# Patient Record
Sex: MALE | Race: WHITE | Employment: FULL TIME | URBAN - METROPOLITAN AREA
[De-identification: names, ages, dates, MRNs, and addresses within clinical notes are randomized per-mention and may not be internally consistent; named-entity substitution may affect disease eponyms.]

---

## 2023-04-01 ENCOUNTER — APPOINTMENT (OUTPATIENT)
Dept: CT IMAGING | Age: 43
End: 2023-04-01
Attending: EMERGENCY MEDICINE
Payer: COMMERCIAL

## 2023-04-01 ENCOUNTER — HOSPITAL ENCOUNTER (EMERGENCY)
Age: 43
Discharge: HOME | End: 2023-04-01
Attending: EMERGENCY MEDICINE
Payer: COMMERCIAL

## 2023-04-01 VITALS
WEIGHT: 230 LBS | HEIGHT: 72 IN | TEMPERATURE: 98.3 F | HEART RATE: 70 BPM | OXYGEN SATURATION: 94 % | SYSTOLIC BLOOD PRESSURE: 167 MMHG | BODY MASS INDEX: 31.15 KG/M2 | RESPIRATION RATE: 11 BRPM | DIASTOLIC BLOOD PRESSURE: 111 MMHG

## 2023-04-01 DIAGNOSIS — R10.12 ABDOMINAL PAIN, LUQ (LEFT UPPER QUADRANT): Primary | ICD-10-CM

## 2023-04-01 DIAGNOSIS — R55 SYNCOPE AND COLLAPSE: ICD-10-CM

## 2023-04-01 LAB
ALBUMIN SERPL-MCNC: 3.9 G/DL (ref 3.5–5)
ALBUMIN/GLOB SERPL: 1.2 (ref 1.1–2.2)
ALP SERPL-CCNC: 74 U/L (ref 45–117)
ALT SERPL-CCNC: 26 U/L (ref 12–78)
AMPHET UR QL SCN: NEGATIVE
ANION GAP SERPL CALC-SCNC: 3 MMOL/L (ref 5–15)
APPEARANCE UR: CLEAR
AST SERPL-CCNC: 12 U/L (ref 15–37)
BACTERIA URNS QL MICRO: NEGATIVE /HPF
BARBITURATES UR QL SCN: NEGATIVE
BASOPHILS # BLD: 0.1 K/UL (ref 0–0.1)
BASOPHILS NFR BLD: 1 % (ref 0–1)
BENZODIAZ UR QL: NEGATIVE
BILIRUB SERPL-MCNC: 0.6 MG/DL (ref 0.2–1)
BILIRUB UR QL: NEGATIVE
BUN SERPL-MCNC: 13 MG/DL (ref 6–20)
BUN/CREAT SERPL: 13 (ref 12–20)
CALCIUM SERPL-MCNC: 9 MG/DL (ref 8.5–10.1)
CANNABINOIDS UR QL SCN: NEGATIVE
CHLORIDE SERPL-SCNC: 106 MMOL/L (ref 97–108)
CK SERPL-CCNC: 73 U/L (ref 39–308)
CO2 SERPL-SCNC: 28 MMOL/L (ref 21–32)
COCAINE UR QL SCN: NEGATIVE
COLOR UR: NORMAL
COMMENT, HOLDF: NORMAL
COMMENT, HOLDF: NORMAL
CREAT SERPL-MCNC: 1 MG/DL (ref 0.7–1.3)
DIFFERENTIAL METHOD BLD: NORMAL
DRUG SCRN COMMENT,DRGCM: NORMAL
EOSINOPHIL # BLD: 0.3 K/UL (ref 0–0.4)
EOSINOPHIL NFR BLD: 3 % (ref 0–7)
EPITH CASTS URNS QL MICRO: NORMAL /LPF
ERYTHROCYTE [DISTWIDTH] IN BLOOD BY AUTOMATED COUNT: 11.7 % (ref 11.5–14.5)
GLOBULIN SER CALC-MCNC: 3.2 G/DL (ref 2–4)
GLUCOSE SERPL-MCNC: 100 MG/DL (ref 65–100)
GLUCOSE UR STRIP.AUTO-MCNC: NEGATIVE MG/DL
HCT VFR BLD AUTO: 40.9 % (ref 36.6–50.3)
HGB BLD-MCNC: 14.6 G/DL (ref 12.1–17)
HGB UR QL STRIP: NEGATIVE
HYALINE CASTS URNS QL MICRO: NORMAL /LPF (ref 0–2)
IMM GRANULOCYTES # BLD AUTO: 0 K/UL (ref 0–0.04)
IMM GRANULOCYTES NFR BLD AUTO: 0 % (ref 0–0.5)
KETONES UR QL STRIP.AUTO: NEGATIVE MG/DL
LEUKOCYTE ESTERASE UR QL STRIP.AUTO: NEGATIVE
LIPASE SERPL-CCNC: 132 U/L (ref 73–393)
LYMPHOCYTES # BLD: 2.4 K/UL (ref 0.8–3.5)
LYMPHOCYTES NFR BLD: 26 % (ref 12–49)
MCH RBC QN AUTO: 29.4 PG (ref 26–34)
MCHC RBC AUTO-ENTMCNC: 35.7 G/DL (ref 30–36.5)
MCV RBC AUTO: 82.3 FL (ref 80–99)
METHADONE UR QL: NEGATIVE
MONOCYTES # BLD: 0.6 K/UL (ref 0–1)
MONOCYTES NFR BLD: 7 % (ref 5–13)
NEUTS SEG # BLD: 5.8 K/UL (ref 1.8–8)
NEUTS SEG NFR BLD: 63 % (ref 32–75)
NITRITE UR QL STRIP.AUTO: NEGATIVE
NRBC # BLD: 0 K/UL (ref 0–0.01)
NRBC BLD-RTO: 0 PER 100 WBC
OPIATES UR QL: NEGATIVE
PCP UR QL: NEGATIVE
PH UR STRIP: 5.5 (ref 5–8)
PLATELET # BLD AUTO: 284 K/UL (ref 150–400)
PMV BLD AUTO: 10.4 FL (ref 8.9–12.9)
POTASSIUM SERPL-SCNC: 3.8 MMOL/L (ref 3.5–5.1)
PROT SERPL-MCNC: 7.1 G/DL (ref 6.4–8.2)
PROT UR STRIP-MCNC: NEGATIVE MG/DL
RBC # BLD AUTO: 4.97 M/UL (ref 4.1–5.7)
RBC #/AREA URNS HPF: NORMAL /HPF (ref 0–5)
SAMPLES BEING HELD,HOLD: NORMAL
SAMPLES BEING HELD,HOLD: NORMAL
SODIUM SERPL-SCNC: 137 MMOL/L (ref 136–145)
SP GR UR REFRACTOMETRY: 1.02 (ref 1–1.03)
UA: UC IF INDICATED,UAUC: NORMAL
UROBILINOGEN UR QL STRIP.AUTO: 0.2 EU/DL (ref 0.2–1)
WBC # BLD AUTO: 9.1 K/UL (ref 4.1–11.1)
WBC URNS QL MICRO: NORMAL /HPF (ref 0–4)

## 2023-04-01 PROCEDURE — 82550 ASSAY OF CK (CPK): CPT

## 2023-04-01 PROCEDURE — 85025 COMPLETE CBC W/AUTO DIFF WBC: CPT

## 2023-04-01 PROCEDURE — 80307 DRUG TEST PRSMV CHEM ANLYZR: CPT

## 2023-04-01 PROCEDURE — 99285 EMERGENCY DEPT VISIT HI MDM: CPT

## 2023-04-01 PROCEDURE — 36415 COLL VENOUS BLD VENIPUNCTURE: CPT

## 2023-04-01 PROCEDURE — 80053 COMPREHEN METABOLIC PANEL: CPT

## 2023-04-01 PROCEDURE — 81001 URINALYSIS AUTO W/SCOPE: CPT

## 2023-04-01 PROCEDURE — 93005 ELECTROCARDIOGRAM TRACING: CPT

## 2023-04-01 PROCEDURE — 74178 CT ABD&PLV WO CNTR FLWD CNTR: CPT

## 2023-04-01 PROCEDURE — 74011250636 HC RX REV CODE- 250/636: Performed by: EMERGENCY MEDICINE

## 2023-04-01 PROCEDURE — 83690 ASSAY OF LIPASE: CPT

## 2023-04-01 PROCEDURE — 74011000636 HC RX REV CODE- 636: Performed by: RADIOLOGY

## 2023-04-01 PROCEDURE — 70450 CT HEAD/BRAIN W/O DYE: CPT

## 2023-04-01 RX ORDER — DICYCLOMINE HYDROCHLORIDE 20 MG/1
20 TABLET ORAL
Qty: 20 TABLET | Refills: 0 | Status: SHIPPED | OUTPATIENT
Start: 2023-04-01

## 2023-04-01 RX ADMIN — IOPAMIDOL 100 ML: 755 INJECTION, SOLUTION INTRAVENOUS at 14:02

## 2023-04-01 RX ADMIN — SODIUM CHLORIDE 1000 ML: 9 INJECTION, SOLUTION INTRAVENOUS at 13:24

## 2023-04-01 NOTE — ED TRIAGE NOTES
Pt arrived to ED in wheelchair c/o bright red blood in stool x 1 month with syncopal episode x 3 weeks ago. States he came to ED today because he woke up this morning with LUQ abdominal pain. Denies N/V/D. Takes Kratom and Suboxone. Occasional Etoh use. Denies any medical history.

## 2023-04-01 NOTE — ED PROVIDER NOTES
79-year-old male denies any pertinent medical history presents to the emergency department chief complaint of abdominal pain in his left lower quadrant since the past several days but worsening today. Has had episodes of syncope including couple weeks ago and today. He thinks this may be due to Kratom use. He is on buprenorphine. He was seen at an outside emergency department about 3 weeks ago for syncopal episode with unremarkable work-up. Also has had some rectal bleeding for the past several days. No abdominal surgeries.  aware shows buprenorphine prescription from [place]     Has not been taking lisinopril which was prescribed about a week ago      Melena        No past medical history on file. No past surgical history on file. No family history on file. Social History     Socioeconomic History    Marital status:      Spouse name: Not on file    Number of children: Not on file    Years of education: Not on file    Highest education level: Not on file   Occupational History    Not on file   Tobacco Use    Smoking status: Former    Smokeless tobacco: Current   Vaping Use    Vaping Use: Not on file   Substance and Sexual Activity    Alcohol use: Not Currently    Drug use: Not Currently    Sexual activity: Not on file   Other Topics Concern    Not on file   Social History Narrative    Not on file     Social Determinants of Health     Financial Resource Strain: Not on file   Food Insecurity: Not on file   Transportation Needs: Not on file   Physical Activity: Not on file   Stress: Not on file   Social Connections: Not on file   Intimate Partner Violence: Not on file   Housing Stability: Not on file         ALLERGIES: Patient has no known allergies. Review of Systems   Gastrointestinal:  Positive for melena.      Vitals:    04/01/23 1230   BP: (!) 150/100   Pulse: 73   Resp: 16   Temp: 98.3 °F (36.8 °C)   SpO2: 99%   Weight: 104.3 kg (230 lb)   Height: 6' (1.829 m)            Physical Exam  Vitals and nursing note reviewed. Constitutional:       Appearance: Normal appearance. Cardiovascular:      Rate and Rhythm: Normal rate. Pulses: Normal pulses. Pulmonary:      Effort: Pulmonary effort is normal. No respiratory distress. Abdominal:      Palpations: Abdomen is soft. Tenderness: There is no abdominal tenderness. Neurological:      Mental Status: He is alert. Medical Decision Making  44-year-old male presents as above with presyncopal symptoms, recent syncope, abdominal pain. Work-up in the emergency department is reassuring. He is low risk by [place] syncope rules. Labs are reassuring. CT scan without acute pathology. Potentially is having exaggerated vasovagal response to abdominal pain. Will trial dicyclomine and refer to cardiology for syncope. Amount and/or Complexity of Data Reviewed  Labs: ordered. Radiology: ordered and independent interpretation performed. Decision-making details documented in ED Course. ECG/medicine tests: ordered and independent interpretation performed. Decision-making details documented in ED Course. Risk  Prescription drug management. ED Course as of 04/01/23 1511   Sat Apr 01, 2023   1259 Patient reports to nurse that he now feels that he has blurry vision and body tingling. Neurologic examination complains of blurry vision despite which eye I cover. Finger-nose intact, strength intact and equal bilaterally, sensation intact and equal bilaterally. [JM]   1310   ED EKG interpretation:  Rhythm: normal sinus rhythm. Rate (approx.): 68.  Axis: normal.  ST segment:  No concerning ST elevations or depressions. This EKG was interpreted by Shari Khalil MD,ED Provider. [JM]   6092 I have independently viewed the obtained radiographic images and note CT head without acute findings. Will await radiology read.  [JM]      ED Course User Index  [JM] Michael Hernandez MD       Procedures

## 2023-04-01 NOTE — DISCHARGE INSTRUCTIONS
Thank you for allowing us to provide you with medical care today. We realize that you have many choices for your emergency care needs. We thank you for choosing [facility]. Please choose us in the future for any continued health care needs. We hope we addressed all of your medical concerns. We strive to provide excellent quality care in the Emergency Department. Anything less than excellent does not meet our expectations. The exam and treatment you received in the Emergency Department were for an emergent problem and are not intended as complete care. It is important that you follow up with a doctor, nurse practitioner, or physician's assistant for ongoing care. If your symptoms worsen or you do not improve as expected and you are unable to reach your usual health care provider, you should return to the Emergency Department. We are available 24 hours a day. Take this sheet with you when you go to your follow-up visit. If you have any problem arranging the follow-up visit, contact the Emergency Department immediately. Make an appointment your family doctor for follow up of this visit. Return to the ER if you are unable to be seen in a timely manner.

## 2023-04-02 LAB
ATRIAL RATE: 68 BPM
CALCULATED P AXIS, ECG09: 58 DEGREES
CALCULATED R AXIS, ECG10: 12 DEGREES
CALCULATED T AXIS, ECG11: 31 DEGREES
DIAGNOSIS, 93000: NORMAL
P-R INTERVAL, ECG05: 136 MS
Q-T INTERVAL, ECG07: 408 MS
QRS DURATION, ECG06: 86 MS
QTC CALCULATION (BEZET), ECG08: 433 MS
VENTRICULAR RATE, ECG03: 68 BPM

## 2023-04-04 ENCOUNTER — HOSPITAL ENCOUNTER (OUTPATIENT)
Age: 43
End: 2023-04-04
Attending: EMERGENCY MEDICINE
Payer: COMMERCIAL

## 2023-04-04 VITALS
OXYGEN SATURATION: 96 % | RESPIRATION RATE: 16 BRPM | TEMPERATURE: 97.9 F | WEIGHT: 230 LBS | HEART RATE: 59 BPM | HEIGHT: 72 IN | BODY MASS INDEX: 31.15 KG/M2 | SYSTOLIC BLOOD PRESSURE: 101 MMHG | DIASTOLIC BLOOD PRESSURE: 62 MMHG

## 2023-04-04 LAB
ALBUMIN SERPL-MCNC: 3.6 G/DL (ref 3.5–5)
ALBUMIN/GLOB SERPL: 1.2 (ref 1.1–2.2)
ALP SERPL-CCNC: 77 U/L (ref 45–117)
ALT SERPL-CCNC: 24 U/L (ref 12–78)
ANION GAP SERPL CALC-SCNC: 3 MMOL/L (ref 5–15)
AST SERPL-CCNC: 13 U/L (ref 15–37)
BASOPHILS # BLD: 0.1 K/UL (ref 0–0.1)
BASOPHILS NFR BLD: 1 % (ref 0–1)
BILIRUB SERPL-MCNC: 0.4 MG/DL (ref 0.2–1)
BUN SERPL-MCNC: 12 MG/DL (ref 6–20)
BUN/CREAT SERPL: 12 (ref 12–20)
CALCIUM SERPL-MCNC: 8.6 MG/DL (ref 8.5–10.1)
CHLORIDE SERPL-SCNC: 106 MMOL/L (ref 97–108)
CO2 SERPL-SCNC: 28 MMOL/L (ref 21–32)
COMMENT, HOLDF: NORMAL
CREAT SERPL-MCNC: 1 MG/DL (ref 0.7–1.3)
D DIMER PPP FEU-MCNC: 0.19 MG/L FEU (ref 0–0.65)
DIFFERENTIAL METHOD BLD: ABNORMAL
EOSINOPHIL # BLD: 0.3 K/UL (ref 0–0.4)
EOSINOPHIL NFR BLD: 4 % (ref 0–7)
ERYTHROCYTE [DISTWIDTH] IN BLOOD BY AUTOMATED COUNT: 12.1 % (ref 11.5–14.5)
GLOBULIN SER CALC-MCNC: 3.1 G/DL (ref 2–4)
GLUCOSE SERPL-MCNC: 125 MG/DL (ref 65–100)
HCT VFR BLD AUTO: 39.1 % (ref 36.6–50.3)
HGB BLD-MCNC: 13.7 G/DL (ref 12.1–17)
IMM GRANULOCYTES # BLD AUTO: 0 K/UL (ref 0–0.04)
IMM GRANULOCYTES NFR BLD AUTO: 1 % (ref 0–0.5)
LIPASE SERPL-CCNC: 93 U/L (ref 73–393)
LYMPHOCYTES # BLD: 2.1 K/UL (ref 0.8–3.5)
LYMPHOCYTES NFR BLD: 26 % (ref 12–49)
MAGNESIUM SERPL-MCNC: 2 MG/DL (ref 1.6–2.4)
MCH RBC QN AUTO: 29.7 PG (ref 26–34)
MCHC RBC AUTO-ENTMCNC: 35 G/DL (ref 30–36.5)
MCV RBC AUTO: 84.8 FL (ref 80–99)
MONOCYTES # BLD: 0.6 K/UL (ref 0–1)
MONOCYTES NFR BLD: 7 % (ref 5–13)
NEUTS SEG # BLD: 5.2 K/UL (ref 1.8–8)
NEUTS SEG NFR BLD: 61 % (ref 32–75)
NRBC # BLD: 0 K/UL (ref 0–0.01)
NRBC BLD-RTO: 0 PER 100 WBC
PLATELET # BLD AUTO: 250 K/UL (ref 150–400)
PMV BLD AUTO: 10.9 FL (ref 8.9–12.9)
POTASSIUM SERPL-SCNC: 3.9 MMOL/L (ref 3.5–5.1)
PROT SERPL-MCNC: 6.7 G/DL (ref 6.4–8.2)
RBC # BLD AUTO: 4.61 M/UL (ref 4.1–5.7)
SAMPLES BEING HELD,HOLD: NORMAL
SODIUM SERPL-SCNC: 137 MMOL/L (ref 136–145)
TROPONIN I SERPL HS-MCNC: 4 NG/L (ref 0–76)
TSH SERPL DL<=0.05 MIU/L-ACNC: 2.73 UIU/ML (ref 0.36–3.74)
WBC # BLD AUTO: 8.4 K/UL (ref 4.1–11.1)

## 2023-04-04 PROCEDURE — 74011250636 HC RX REV CODE- 250/636: Performed by: EMERGENCY MEDICINE

## 2023-04-04 PROCEDURE — 99284 EMERGENCY DEPT VISIT MOD MDM: CPT

## 2023-04-04 PROCEDURE — 83735 ASSAY OF MAGNESIUM: CPT

## 2023-04-04 PROCEDURE — 85025 COMPLETE CBC W/AUTO DIFF WBC: CPT

## 2023-04-04 PROCEDURE — 84484 ASSAY OF TROPONIN QUANT: CPT

## 2023-04-04 PROCEDURE — 84443 ASSAY THYROID STIM HORMONE: CPT

## 2023-04-04 PROCEDURE — 36415 COLL VENOUS BLD VENIPUNCTURE: CPT

## 2023-04-04 PROCEDURE — 93005 ELECTROCARDIOGRAM TRACING: CPT

## 2023-04-04 PROCEDURE — 83690 ASSAY OF LIPASE: CPT

## 2023-04-04 PROCEDURE — 85379 FIBRIN DEGRADATION QUANT: CPT

## 2023-04-04 PROCEDURE — 80053 COMPREHEN METABOLIC PANEL: CPT

## 2023-04-04 RX ADMIN — SODIUM CHLORIDE 1000 ML: 9 INJECTION, SOLUTION INTRAVENOUS at 10:50

## 2023-04-04 NOTE — ED NOTES
Patient does not appear to be in any acute distress/shows no evidence of clinical instability at this time. Provider has reviewed discharge instructions with the patient. The patient verbalized understanding instructions as well as need for follow up for any further symptoms. Discharge papers given, education provided, and any questions answered. Patient discharged by provider.

## 2023-04-04 NOTE — ED TRIAGE NOTES
Patient arrives to the ED via POV with complaints of a syncopal episode that happened this morning. Patient reports lightheadedness and right hand numbness prior to syncopal episode. Patient was seen here Saturday for same symptoms. Patient also reports melena.

## 2023-04-04 NOTE — ED PROVIDER NOTES
Iván Carias is a 37 yo M with who developed shortness of breath and tingling in his hand and feet before passing out in his brother's car on the way to the ED. He states he has been feeling lightheaded on and off for several weeks and is concerned that he may be having a heart attack. He takes Suboxone which he gets online and also takes Ktatom but does not believe that his symptoms could be related to that. He notes that he had a heart attack at age 31 when he was living in [place], [place]. He was seen in the ED 3 days ago with concern for GI bleed but his HGB was normal and CT showed no evidence of bleeding. A CK was checked at that time but no troponin or magnesium. Patient states he has tried to make an appointment to see a PCP but they are 2 months out so he did not make the appointment. No past medical history on file. No past surgical history on file. No family history on file. Social History     Socioeconomic History    Marital status:      Spouse name: Not on file    Number of children: Not on file    Years of education: Not on file    Highest education level: Not on file   Occupational History    Not on file   Tobacco Use    Smoking status: Former    Smokeless tobacco: Current   Vaping Use    Vaping Use: Not on file   Substance and Sexual Activity    Alcohol use: Not Currently    Drug use: Not Currently    Sexual activity: Not on file   Other Topics Concern    Not on file   Social History Narrative    Not on file     Social Determinants of Health     Financial Resource Strain: Not on file   Food Insecurity: Not on file   Transportation Needs: Not on file   Physical Activity: Not on file   Stress: Not on file   Social Connections: Not on file   Intimate Partner Violence: Not on file   Housing Stability: Not on file         ALLERGIES: Patient has no known allergies. Review of Systems   Constitutional:  Negative for fever. HENT:  Negative for sore throat.     Eyes: Negative for visual disturbance. Respiratory:  Positive for chest tightness. Negative for cough. Cardiovascular:  Negative for chest pain. Gastrointestinal:  Negative for abdominal pain. Genitourinary:  Negative for dysuria. Musculoskeletal:  Negative for back pain. Skin:  Negative for rash. Neurological:  Positive for syncope. Negative for headaches. Numbness: paresthesias. Vitals:    04/04/23 1044   BP: 121/74   Pulse: (!) 59   Resp: 18   Temp: 97.2 °F (36.2 °C)   SpO2: 99%   Weight: 104.3 kg (230 lb)   Height: 6' (1.829 m)            Physical Exam  Vitals and nursing note reviewed. Constitutional:       General: He is not in acute distress. Appearance: He is well-developed. HENT:      Head: Normocephalic and atraumatic. Mouth/Throat:      Mouth: Mucous membranes are moist.   Eyes:      Extraocular Movements: Extraocular movements intact. Conjunctiva/sclera: Conjunctivae normal.   Neck:      Trachea: Phonation normal.   Cardiovascular:      Rate and Rhythm: Normal rate. Pulmonary:      Effort: Pulmonary effort is normal. No respiratory distress. Breath sounds: No wheezing or rales. Abdominal:      General: There is no distension. Musculoskeletal:         General: No tenderness. Normal range of motion. Cervical back: Normal range of motion. Skin:     General: Skin is warm and dry. Neurological:      General: No focal deficit present. Mental Status: He is alert. He is not disoriented. Cranial Nerves: Cranial nerves 2-12 are intact. Sensory: Sensation is intact. Motor: Motor function is intact. No abnormal muscle tone. Psychiatric:         Mood and Affect: Mood is anxious. ED EKG interpretation:  Rhythm: sinus bradycardia; and regular . Rate (approx.): 52; Axis: normal; P wave: normal; QRS interval: normal ; ST/T wave: normal; Other findings: otherwise normal. This EKG was interpreted by Bunny Trujillo MD,ED Provider.      Medical Decision Making  Amount and/or Complexity of Data Reviewed  Labs: ordered. ECG/medicine tests: ordered. 1:46 PM  Patient remains in no distress. Labs including, CBC, CMP, trop, magnesium, ddimer and TSH normal.  Patient updated on results and discharged home.      Procedures

## 2023-04-05 ENCOUNTER — APPOINTMENT (OUTPATIENT)
Dept: GENERAL RADIOLOGY | Age: 43
End: 2023-04-05
Attending: STUDENT IN AN ORGANIZED HEALTH CARE EDUCATION/TRAINING PROGRAM
Payer: COMMERCIAL

## 2023-04-05 ENCOUNTER — HOSPITAL ENCOUNTER (OUTPATIENT)
Age: 43
End: 2023-04-05
Attending: STUDENT IN AN ORGANIZED HEALTH CARE EDUCATION/TRAINING PROGRAM
Payer: COMMERCIAL

## 2023-04-05 ENCOUNTER — APPOINTMENT (OUTPATIENT)
Dept: NON INVASIVE DIAGNOSTICS | Age: 43
End: 2023-04-05
Attending: INTERNAL MEDICINE
Payer: COMMERCIAL

## 2023-04-05 PROBLEM — R07.9 CHEST PAIN: Status: ACTIVE | Noted: 2023-04-05

## 2023-04-05 PROBLEM — R55 SYNCOPE AND COLLAPSE: Status: ACTIVE | Noted: 2023-04-05

## 2023-04-05 PROBLEM — R55 SYNCOPE: Status: ACTIVE | Noted: 2023-04-05

## 2023-04-05 PROBLEM — R06.09 DOE (DYSPNEA ON EXERTION): Status: ACTIVE | Noted: 2023-04-05

## 2023-04-05 PROBLEM — E78.5 HYPERLIPIDEMIA: Status: ACTIVE | Noted: 2023-04-05

## 2023-04-05 PROBLEM — I10 HTN (HYPERTENSION), BENIGN: Status: ACTIVE | Noted: 2023-04-05

## 2023-04-05 PROBLEM — R00.1 BRADYCARDIA: Status: ACTIVE | Noted: 2023-04-05

## 2023-04-05 LAB
ALBUMIN SERPL-MCNC: 3.5 G/DL (ref 3.5–5)
ALBUMIN/GLOB SERPL: 1.2 (ref 1.1–2.2)
ALP SERPL-CCNC: 71 U/L (ref 45–117)
ALT SERPL-CCNC: 23 U/L (ref 12–78)
ANION GAP SERPL CALC-SCNC: 3 MMOL/L (ref 5–15)
AST SERPL-CCNC: 11 U/L (ref 15–37)
ATRIAL RATE: 52 BPM
BASOPHILS # BLD: 0.1 K/UL (ref 0–0.1)
BASOPHILS NFR BLD: 1 % (ref 0–1)
BILIRUB SERPL-MCNC: 0.4 MG/DL (ref 0.2–1)
BNP SERPL-MCNC: 55 PG/ML
BUN SERPL-MCNC: 11 MG/DL (ref 6–20)
BUN/CREAT SERPL: 13 (ref 12–20)
CALCIUM SERPL-MCNC: 8.5 MG/DL (ref 8.5–10.1)
CALCULATED P AXIS, ECG09: 49 DEGREES
CALCULATED R AXIS, ECG10: 27 DEGREES
CALCULATED T AXIS, ECG11: 39 DEGREES
CHLORIDE SERPL-SCNC: 108 MMOL/L (ref 97–108)
CO2 SERPL-SCNC: 27 MMOL/L (ref 21–32)
COMMENT, HOLDF: NORMAL
CREAT SERPL-MCNC: 0.87 MG/DL (ref 0.7–1.3)
DIAGNOSIS, 93000: NORMAL
DIFFERENTIAL METHOD BLD: NORMAL
EOSINOPHIL # BLD: 0.3 K/UL (ref 0–0.4)
EOSINOPHIL NFR BLD: 4 % (ref 0–7)
ERYTHROCYTE [DISTWIDTH] IN BLOOD BY AUTOMATED COUNT: 12 % (ref 11.5–14.5)
GLOBULIN SER CALC-MCNC: 2.9 G/DL (ref 2–4)
GLUCOSE SERPL-MCNC: 87 MG/DL (ref 65–100)
HCT VFR BLD AUTO: 37.8 % (ref 36.6–50.3)
HGB BLD-MCNC: 13.3 G/DL (ref 12.1–17)
IMM GRANULOCYTES # BLD AUTO: 0 K/UL (ref 0–0.04)
IMM GRANULOCYTES NFR BLD AUTO: 0 % (ref 0–0.5)
LYMPHOCYTES # BLD: 1.8 K/UL (ref 0.8–3.5)
LYMPHOCYTES NFR BLD: 25 % (ref 12–49)
MCH RBC QN AUTO: 29.5 PG (ref 26–34)
MCHC RBC AUTO-ENTMCNC: 35.2 G/DL (ref 30–36.5)
MCV RBC AUTO: 83.8 FL (ref 80–99)
MONOCYTES # BLD: 0.6 K/UL (ref 0–1)
MONOCYTES NFR BLD: 9 % (ref 5–13)
NEUTS SEG # BLD: 4.5 K/UL (ref 1.8–8)
NEUTS SEG NFR BLD: 61 % (ref 32–75)
NRBC # BLD: 0 K/UL (ref 0–0.01)
NRBC BLD-RTO: 0 PER 100 WBC
P-R INTERVAL, ECG05: 150 MS
PLATELET # BLD AUTO: 251 K/UL (ref 150–400)
PMV BLD AUTO: 11 FL (ref 8.9–12.9)
POTASSIUM SERPL-SCNC: 4 MMOL/L (ref 3.5–5.1)
PROT SERPL-MCNC: 6.4 G/DL (ref 6.4–8.2)
Q-T INTERVAL, ECG07: 446 MS
QRS DURATION, ECG06: 86 MS
QTC CALCULATION (BEZET), ECG08: 414 MS
RBC # BLD AUTO: 4.51 M/UL (ref 4.1–5.7)
SAMPLES BEING HELD,HOLD: NORMAL
SODIUM SERPL-SCNC: 138 MMOL/L (ref 136–145)
TROPONIN I SERPL HS-MCNC: 5 NG/L (ref 0–76)
TSH SERPL DL<=0.05 MIU/L-ACNC: 1.55 UIU/ML (ref 0.36–3.74)
VENTRICULAR RATE, ECG03: 52 BPM
WBC # BLD AUTO: 7.3 K/UL (ref 4.1–11.1)

## 2023-04-05 PROCEDURE — 74011250636 HC RX REV CODE- 250/636: Performed by: INTERNAL MEDICINE

## 2023-04-05 PROCEDURE — 83880 ASSAY OF NATRIURETIC PEPTIDE: CPT

## 2023-04-05 PROCEDURE — 84484 ASSAY OF TROPONIN QUANT: CPT

## 2023-04-05 PROCEDURE — 85025 COMPLETE CBC W/AUTO DIFF WBC: CPT

## 2023-04-05 PROCEDURE — 84443 ASSAY THYROID STIM HORMONE: CPT

## 2023-04-05 PROCEDURE — G0378 HOSPITAL OBSERVATION PER HR: HCPCS

## 2023-04-05 PROCEDURE — 93005 ELECTROCARDIOGRAM TRACING: CPT

## 2023-04-05 PROCEDURE — 71045 X-RAY EXAM CHEST 1 VIEW: CPT

## 2023-04-05 PROCEDURE — 74011250636 HC RX REV CODE- 250/636: Performed by: STUDENT IN AN ORGANIZED HEALTH CARE EDUCATION/TRAINING PROGRAM

## 2023-04-05 PROCEDURE — 74011000250 HC RX REV CODE- 250: Performed by: INTERNAL MEDICINE

## 2023-04-05 PROCEDURE — 80053 COMPREHEN METABOLIC PANEL: CPT

## 2023-04-05 PROCEDURE — 36415 COLL VENOUS BLD VENIPUNCTURE: CPT

## 2023-04-05 RX ORDER — ENOXAPARIN SODIUM 100 MG/ML
30 INJECTION SUBCUTANEOUS EVERY 12 HOURS
Status: DISPENSED
Start: 2023-04-05

## 2023-04-05 RX ORDER — BUPRENORPHINE HYDROCHLORIDE AND NALOXONE HYDROCHLORIDE DIHYDRATE 8; 2 MG/1; MG/1
0.5 TABLET SUBLINGUAL 3 TIMES DAILY
Start: 2023-03-08

## 2023-04-05 RX ORDER — SODIUM CHLORIDE 0.9 % (FLUSH) 0.9 %
5-40 SYRINGE (ML) INJECTION AS NEEDED
Status: DISPENSED
Start: 2023-04-05

## 2023-04-05 RX ORDER — ACETAMINOPHEN 325 MG/1
650 TABLET ORAL
Status: ACTIVE
Start: 2023-04-05

## 2023-04-05 RX ORDER — AMLODIPINE BESYLATE 5 MG/1: 5 TABLET ORAL DAILY

## 2023-04-05 RX ORDER — SIMVASTATIN 40 MG/1: 40 TABLET, FILM COATED ORAL

## 2023-04-05 RX ORDER — METOPROLOL SUCCINATE 50 MG/1: 50 TABLET, EXTENDED RELEASE ORAL DAILY

## 2023-04-05 RX ORDER — PROMETHAZINE HYDROCHLORIDE 25 MG/1
12.5 TABLET ORAL
Status: ACTIVE
Start: 2023-04-05

## 2023-04-05 RX ORDER — SODIUM CHLORIDE 9 MG/ML
75 INJECTION, SOLUTION INTRAVENOUS CONTINUOUS
Status: DISPENSED
Start: 2023-04-05

## 2023-04-05 RX ORDER — ONDANSETRON 2 MG/ML
4 INJECTION INTRAMUSCULAR; INTRAVENOUS
Status: ACTIVE
Start: 2023-04-05

## 2023-04-05 RX ORDER — SODIUM CHLORIDE 0.9 % (FLUSH) 0.9 %
5-40 SYRINGE (ML) INJECTION EVERY 8 HOURS
Status: DISPENSED
Start: 2023-04-05

## 2023-04-05 RX ORDER — SENNOSIDES 8.6 MG/1
1 TABLET ORAL DAILY PRN
Status: ACTIVE
Start: 2023-04-05

## 2023-04-05 RX ORDER — POLYETHYLENE GLYCOL 3350 17 G/17G
17 POWDER, FOR SOLUTION ORAL DAILY PRN
Status: ACTIVE
Start: 2023-04-05

## 2023-04-05 RX ORDER — ACETAMINOPHEN 650 MG/1
650 SUPPOSITORY RECTAL
Status: ACTIVE
Start: 2023-04-05

## 2023-04-05 RX ORDER — LISINOPRIL 20 MG/1: 20 TABLET ORAL DAILY

## 2023-04-05 RX ORDER — PHENOL/SODIUM PHENOLATE: 20 AEROSOL, SPRAY (ML) MUCOUS MEMBRANE DAILY

## 2023-04-05 RX ORDER — BUPRENORPHINE AND NALOXONE 8; 2 MG/1; MG/1
1 FILM, SOLUBLE BUCCAL; SUBLINGUAL DAILY
End: 2023-04-05 | Stop reason: CLARIF

## 2023-04-05 RX ADMIN — SODIUM CHLORIDE 1000 ML: 9 INJECTION, SOLUTION INTRAVENOUS at 14:36

## 2023-04-05 RX ADMIN — SODIUM CHLORIDE 75 ML/HR: 9 INJECTION, SOLUTION INTRAVENOUS at 20:59

## 2023-04-05 RX ADMIN — SODIUM CHLORIDE, PRESERVATIVE FREE 10 ML: 5 INJECTION INTRAVENOUS at 21:00

## 2023-04-05 RX ADMIN — SODIUM CHLORIDE, PRESERVATIVE FREE 10 ML: 5 INJECTION INTRAVENOUS at 20:59

## 2023-04-05 RX ADMIN — ENOXAPARIN SODIUM 30 MG: 100 INJECTION SUBCUTANEOUS at 20:59

## 2023-04-05 NOTE — ED PROVIDER NOTES
HPI     Date of Service:  4/5/2023    Patient:  Heide Mendez    Chief Complaint:  Syncope       HPI:  Heide Mendez is a 36 y.o.  male with a past medical history of HTN, reported CAD who presents for evaluation of a syncope. This the patient's 3rd visit for syncope in the last month. Patient reports he was laying down when he began to feel lightheadedness, tingling in his hands and feet, states he was going to sit up but didn't because he passed out. Denies falling as he was laying down when he passed out. States he did have proceeding chest \"tightness. \" Notes continued lightheadedness. No vomiting or diarrhea. No black or bloody stools. No other recent illness. Past medical history: see hpi    No past surgical history on file. No family history on file. Social History     Socioeconomic History    Marital status:      Spouse name: Not on file    Number of children: Not on file    Years of education: Not on file    Highest education level: Not on file   Occupational History    Not on file   Tobacco Use    Smoking status: Former    Smokeless tobacco: Current   Vaping Use    Vaping Use: Not on file   Substance and Sexual Activity    Alcohol use: Not Currently    Drug use: Not Currently    Sexual activity: Not on file   Other Topics Concern    Not on file   Social History Narrative    Not on file     Social Determinants of Health     Financial Resource Strain: Not on file   Food Insecurity: Not on file   Transportation Needs: Not on file   Physical Activity: Not on file   Stress: Not on file   Social Connections: Not on file   Intimate Partner Violence: Not on file   Housing Stability: Not on file         ALLERGIES: Patient has no known allergies. Review of Systems   Constitutional:  Negative for chills and fever. HENT:  Negative for congestion and rhinorrhea. Eyes:  Negative for discharge and redness. Respiratory:  Negative for cough and shortness of breath.     Cardiovascular: Positive for chest pain. Gastrointestinal:  Negative for abdominal pain, diarrhea, nausea and vomiting. Neurological:  Positive for syncope and light-headedness. Negative for speech difficulty. Psychiatric/Behavioral:  Negative for agitation and confusion. Vitals:    04/05/23 1329   BP: 112/80   Pulse: 62   Resp: 15   Temp: 98.1 °F (36.7 °C)   SpO2: 100%            Physical Exam  Vitals and nursing note reviewed. Constitutional:       General: He is not in acute distress. Appearance: Normal appearance. He is not toxic-appearing. HENT:      Head: Normocephalic and atraumatic. Eyes:      General: No scleral icterus. Right eye: No discharge. Left eye: No discharge. Extraocular Movements: Extraocular movements intact. Conjunctiva/sclera: Conjunctivae normal.   Cardiovascular:      Rate and Rhythm: Normal rate and regular rhythm. Pulses: Normal pulses. Pulmonary:      Effort: Pulmonary effort is normal. No respiratory distress. Breath sounds: Normal breath sounds. Abdominal:      General: Abdomen is flat. Palpations: Abdomen is soft. Tenderness: There is no abdominal tenderness. There is no guarding or rebound. Musculoskeletal:         General: Normal range of motion. Right lower leg: No edema. Left lower leg: No edema. Skin:     General: Skin is warm and dry. Capillary Refill: Capillary refill takes less than 2 seconds. Neurological:      General: No focal deficit present. Mental Status: He is alert and oriented to person, place, and time. Psychiatric:         Mood and Affect: Mood normal.         Behavior: Behavior normal.        Medical Decision Making      DECISION MAKING:  Christy Dan is a 36 y.o. male who comes in as above. On arrival patient is afebrile, vital signs are stable. On my examination he is well-appearing, no acute distress.   He does endorse having preceding lightheadedness prior to syncopal episode and it does sound like it was associated with attempted change in position. Also endorsing that he had chest tightness prior to the syncope. Patient was seen in the emergency department on 4/4 for similar symptoms as well as another ER for syncope several weeks ago. Notes symptoms are worsening. CBC without leukocytosis or anemia. Electrolytes, kidney function and LFTs within normal limits. High-sensitivity troponin within normal notes at 5 and BNP is not elevated. ECG shows no arrhythmia or heart block. Given this is the patient's third ER visit for syncope and endorsing that he is having increased episodes of syncope I feel patient requires admission for further work-up of his syncopal episodes. I discussed plan with patient, he is in agreement. Patient's case was discussed with the hospitalist, Dr. Dennis York for admission. Perfect Serve Consult for Admission  3:19 PM    ED Room Number: ER08/08  Patient Name and age:  Don Certain 36 y.o.  male  Working Diagnosis: Syncope and collapse  (primary encounter diagnosis)    COVID-19 Suspicion:  no  Sepsis present:  no  Reassessment needed: no  Code Status:  Full Code  Readmission: no  Isolation Requirements:  no  Recommended Level of Care:  telemetry  Department: [facility] - [phone]  Admitting Provider: Dennis York    Other:  36 y.o.  male with a past medical history of HTN, reported CAD with multiple syncope over the last 3 weeks, 3rd ED visit for this. Labs and ECG unremarkable. HR does dip to the 50's but sinus    Amount and/or Complexity of Data Reviewed  Labs: ordered. Decision-making details documented in ED Course. Radiology: ordered. ECG/medicine tests: ordered and independent interpretation performed. Decision-making details documented in ED Course. Risk  Decision regarding hospitalization.       ED Course as of 04/05/23 1519   Wed Apr 05, 2023   1446 EKG, 12 LEAD, INITIAL  ECG at 1320, interpreted by me: Normal sinus rhythm, rate 61 bpm.  Normal axis. Normal intervals. No ST elevations. Interpretation is limited by artifact. [SH]      ED Course User Index  [SH] Konstantin Shaver DO       Procedures      LABS:  Recent Results (from the past 6 hour(s))   CBC WITH AUTOMATED DIFF    Collection Time: 04/05/23  1:37 PM   Result Value Ref Range    WBC 7.3 4.1 - 11.1 K/uL    RBC 4.51 4.10 - 5.70 M/uL    HGB 13.3 12.1 - 17.0 g/dL    HCT 37.8 36.6 - 50.3 %    MCV 83.8 80.0 - 99.0 FL    MCH 29.5 26.0 - 34.0 PG    MCHC 35.2 30.0 - 36.5 g/dL    RDW 12.0 11.5 - 14.5 %    PLATELET 524 267 - 957 K/uL    MPV 11.0 8.9 - 12.9 FL    NRBC 0.0 0  WBC    ABSOLUTE NRBC 0.00 0.00 - 0.01 K/uL    NEUTROPHILS 61 32 - 75 %    LYMPHOCYTES 25 12 - 49 %    MONOCYTES 9 5 - 13 %    EOSINOPHILS 4 0 - 7 %    BASOPHILS 1 0 - 1 %    IMMATURE GRANULOCYTES 0 0.0 - 0.5 %    ABS. NEUTROPHILS 4.5 1.8 - 8.0 K/UL    ABS. LYMPHOCYTES 1.8 0.8 - 3.5 K/UL    ABS. MONOCYTES 0.6 0.0 - 1.0 K/UL    ABS. EOSINOPHILS 0.3 0.0 - 0.4 K/UL    ABS. BASOPHILS 0.1 0.0 - 0.1 K/UL    ABS. IMM. GRANS. 0.0 0.00 - 0.04 K/UL    DF AUTOMATED     METABOLIC PANEL, COMPREHENSIVE    Collection Time: 04/05/23  1:37 PM   Result Value Ref Range    Sodium 138 136 - 145 mmol/L    Potassium 4.0 3.5 - 5.1 mmol/L    Chloride 108 97 - 108 mmol/L    CO2 27 21 - 32 mmol/L    Anion gap 3 (L) 5 - 15 mmol/L    Glucose 87 65 - 100 mg/dL    BUN 11 6 - 20 MG/DL    Creatinine 0.87 0.70 - 1.30 MG/DL    BUN/Creatinine ratio 13 12 - 20      eGFR >60 >60 ml/min/1.73m2    Calcium 8.5 8.5 - 10.1 MG/DL    Bilirubin, total 0.4 0.2 - 1.0 MG/DL    ALT (SGPT) 23 12 - 78 U/L    AST (SGOT) 11 (L) 15 - 37 U/L    Alk.  phosphatase 71 45 - 117 U/L    Protein, total 6.4 6.4 - 8.2 g/dL    Albumin 3.5 3.5 - 5.0 g/dL    Globulin 2.9 2.0 - 4.0 g/dL    A-G Ratio 1.2 1.1 - 2.2     NT-PRO BNP    Collection Time: 04/05/23  1:37 PM   Result Value Ref Range    NT pro-BNP 55 <125 PG/ML   TROPONIN-HIGH SENSITIVITY    Collection Time: 04/05/23  1:37 PM   Result Value Ref Range    Troponin-High Sensitivity 5 0 - 76 ng/L   SAMPLES BEING HELD    Collection Time: 04/05/23  1:37 PM   Result Value Ref Range    SAMPLES BEING HELD 1 BLUE, 1 RED, 1 SST     COMMENT        Add-on orders for these samples will be processed based on acceptable specimen integrity and analyte stability, which may vary by analyte. IMAGING:  XR CHEST PORT   Final Result   Mild interstitial prominence. .  . Medications During Visit:  Medications   sodium chloride 0.9 % bolus infusion 1,000 mL (1,000 mL IntraVENous New Bag 4/5/23 5909)         IMPRESSION:  1.  Syncope and collapse        DISPOSITION:  Admitted        [facility], DO

## 2023-04-05 NOTE — PROGRESS NOTES
[facility] Lovenox Dosing 04/05/23  Lovenox dose change per protocol  Physician: Dr. Ndiaye Files    [facility] Protocol  Enoxaparin prophylaxis dosing (medically ill, surgical patients)   Patient Weight (kg)     50 and below 51 - 100 101 - 150 151 - 174 175 or greater         Estimated CrCl  (ml/min) 30 or greater   30 mg SUBQ daily   40 mg SUBQ daily (or 30 mg BID for ortho) 30 mg SUBQ BID  40 mg SUBQ BID 60mg SUBQ BID      15-29 UFH 5000 units SUBQ BID   30 mg SUBQ daily 30 mg SUBQ daily 40 mg SUBQ daily   60 mg SUBQ daily      Less than 15 or Dialysis UFH 5000 units SUBQ BID   UFH 5000 units SUBQ TID UFH 7500 units SUBQ TID     Estimated Creatinine Clearance: 136.7 mL/min (based on SCr of 0.87 mg/dL).   Current dose: 40mg daily  Recommendation: 30 mg BID

## 2023-04-05 NOTE — H&P
[facility] [address]  [phone]    [facility]      NAME:       Crystal Leger   :       1980   MRN:      [id number]     Date of service:   2023     Chief  Complaint:  Recurrent syncope     History Of Presenting Illness:       Mr. Imani Kim is a 36 y.o. male who is being admitted for recurrent episodes of syncope and collapse. Mr. Imani Kim presented to our Emergency Department today complaining of  having passed out for the third time now in the recent past. He states that there is obvious triggers, occurs with minimal exertion and well as while seated. He does experience exertional SOB associated with lightheadedness and sometimes chest discomfort. No nausea or vomiting. No headaches, speech changes or focal weakness. He says he had a cardiac cath about 13 years ago and was told he had no obstruction. He has a strong family hx of CAD. In the ED< he was noted to be bradycardic. Troponin was neg. He will be admitted for further management. No Known Allergies    Prior to Admission medications    Medication Sig Start Date End Date Taking? Authorizing Provider   dicyclomine (BENTYL) 20 mg tablet Take 1 Tablet by mouth every six (6) hours as needed for Abdominal Cramps. 23   Lennie Vale MD       Past Medical History:   Diagnosis Date    HTN (hypertension), benign     Hyperlipidemia         No past surgical history on file.     Social History     Tobacco Use    Smoking status: Former    Smokeless tobacco: Current   Substance Use Topics    Alcohol use: Not Currently        Family History   Problem Relation Age of Onset    Heart Disease Mother     Heart Disease Brother      Review of Systems:    Constitutional ROS: no fever, chills, rigors or night sweats  Respiratory ROS: no cough, sputum, hemoptysis but exertional dyspnea and  chest discomfort   Cardiovascular ROS: no palpitations, orthopnea, PND or syncope  Endocrine ROS: no polydispsia, polyuria, heat or cold intolerance or major weight change. Gastrointestinal ROS: no dysphagia, abdominal pain, nausea, vomiting or diarrhea    Genito-Urinary ROS: no dysuria, frequency, hematuria, retention or flank pain  Musculoskeletal ROS: no joint pain, swelling or muscular tenderness  Neurological ROS: no headache, confusion, focal weakness or any other neurological symptoms  Psychiatric ROS: no depression, anxiety, mood swings  Dermatological ROS: no rash, pruritis, or urticaria  Heme-Lymph ROS: no swollen glands, bleeding    Examination:    Constitutional:  Visit Vitals  /81   Pulse (!) 52   Temp 98.1 °F (36.7 °C)   Resp 15   SpO2 100%         General:  Weak and ill looking patient in no acute distress  Eyes: Pink conjunctivae, PERRLA with no discharge. Normal eye movements  Ear, Nose, Mouth & Throat: No ottorrhea, rhinorrhea, non tender sinuses, dry mucous membranes  Respiratory:  No accessory muscle use, clear breath sounds without crackles or wheezes  Cardiovascular:  No JVD or murmurs, regular and normal S1, S2 without thrills, bruits or peripheral edema. Capillary refil+, good distal pulses  GI & :  Soft abdomen, non-tender, non-distended, normoactive bowel sounds with no palpable organomegaly  Heme:  No cervical or axillary adenopathy.    Musculoskeletal:  No cyanosis, clubbing, atrophy or deformities  Skin:  No rashes, bruising or ulcers   Neurological: Awake and alert, speech is clear, CNs 2-12 are grossly intact and otherwise non focal  Psychiatric:  Has a good insight and is oriented x 3  ________________________________________________________________________    Data Review: I have reviewed reports and independently interpreted the following  diagnostic tests    Labs:    Recent Labs     04/05/23  1337   WBC 7.3   HGB 13.3   HCT 37.8   PLT 251     Recent Labs     04/05/23  1337 04/04/23  1051    137   K 4.0 3.9    106   CO2 27 28   GLU 87 125*   BUN 11 12   CREA 0.87 1.00   CA 8.5 8.6   MG  --  2.0   ALB 3.5 3.6   ALT 23 24     No components found for: GLPOC  No results for input(s): PH, PCO2, PO2, HCO3, FIO2 in the last 72 hours. No results for input(s): INR, INREXT in the last 72 hours. Imaging Studies:      Chest X-ray: Normal.    Personally reviewed 12 lead EKG - sinus bradycardia     I have also reviewed available old medical records. Assessment & Impression:     Mr. Alexa Melton is a 36 y.o. male being evaluated for:     Principal Problem:    Syncope and collapse (4/5/2023)    Active Problems:    Bradycardia (4/5/2023)      HTN (hypertension), benign (4/5/2023)      Hyperlipidemia (4/5/2023)      SYKES (dyspnea on exertion) (4/5/2023)      Chest pain (4/5/2023)         Plan of management:    Syncope and collapse (4/5/2023) POA: unclear etiology but given overall picture, concerning for arrhythmias from underlying CAD. CT scan head done 4/1 was neg for any acute changes. CT scan abdomen and pelvis neg for any bleeding. Tox screen was neg. Troponin and D dimer both neg. EKG non ischemic. Admit to hospital. Get a TSH, Echocardiogram and consult cardiology    SYKES (dyspnea on exertion) / Chest pain (4/5/2023) / Bradycardia (4/5/2023) POA: concerning for underlying CAD. Work up as noted above. Hold BB    HTN (hypertension), benign (4/5/2023) / Hyperlipidemia (4/5/2023) POA: check a lipid panel. Hold BB. Verify and resume other meds    Code Status:  Full    Surrogate decision maker: Family    Certification: Given the need for further testing and observation, this patient will be admitted to Observation status.      Level of care: Telemetry    Risk of deterioration: high      Total time spent for the care of the patient: 76 [address] [facility] discussed with: Patient, Nursing Staff and ED physician    Prophylaxis: Lovenox    Probable Disposition:  Home w/Family    PCP:      Unknown, Provider, MD          ___________________________________________________    Attending Physician: Fede Siddiqi MD

## 2023-04-05 NOTE — ED NOTES
Bedside and Verbal shift change report given to Yves Klein (oncoming nurse) by Larissa Mcpherson (offgoing nurse). Report included the following information SBAR, ED Summary, MAR and Recent Results.

## 2023-04-06 ENCOUNTER — APPOINTMENT (OUTPATIENT)
Dept: NON INVASIVE DIAGNOSTICS | Age: 43
End: 2023-04-06
Attending: INTERNAL MEDICINE
Payer: COMMERCIAL

## 2023-04-06 PROCEDURE — G0378 HOSPITAL OBSERVATION PER HR: HCPCS

## 2023-04-06 PROCEDURE — 74011000250 HC RX REV CODE- 250: Performed by: INTERNAL MEDICINE

## 2023-04-06 PROCEDURE — 74011250636 HC RX REV CODE- 250/636: Performed by: INTERNAL MEDICINE

## 2023-04-06 PROCEDURE — 93306 TTE W/DOPPLER COMPLETE: CPT

## 2023-04-06 RX ADMIN — ENOXAPARIN SODIUM 30 MG: 100 INJECTION SUBCUTANEOUS at 21:31

## 2023-04-06 RX ADMIN — ENOXAPARIN SODIUM 30 MG: 100 INJECTION SUBCUTANEOUS at 08:54

## 2023-04-06 RX ADMIN — SODIUM CHLORIDE, PRESERVATIVE FREE 10 ML: 5 INJECTION INTRAVENOUS at 21:33

## 2023-04-06 NOTE — PROGRESS NOTES
Problem: Falls - Risk of  Goal: *Absence of Falls  Description: Document Jonh Carty Fall Risk and appropriate interventions in the flowsheet.   Outcome: Progressing Towards Goal  Note: Fall Risk Interventions:

## 2023-04-06 NOTE — PROGRESS NOTES
Medicare Outpatient Observation Notice (MOON)/ [facility] Outpatient Observation Notice (Jaclyn Haque) provided to patient/representative with verbal explanation of the notice. Time allotted for questions regarding the notice. Patient /representative provided a completed copy of the MOON/VOON notice. Copy placed on bedside chart.   Noa Whiteside CMS

## 2023-04-06 NOTE — PROGRESS NOTES
Patient reports feeling of passing out every time he moves and difficulty breathing. Sat on RA 97-98%. Patient placed on 2 L NC for comfort. Follow up made to echo dept. [place] to call Cardiology for consult. Orthostatic BP on flowsheet.  Notified attending MD.

## 2023-04-06 NOTE — PROGRESS NOTES
Virgilio Chand [facility] [address]  [address] [facility], [address] [facility]  [phone]      Hospitalist Progress Note      NAME: Mendoza Chaves   :  1980  MRM:  [id number]    Date of service: 2023  11:45 AM       Assessment and Plan:   Syncope and collapse (2023), recurrent( 3 times in a month): unclear etiology. Monitor on telemetry. CT scan head done on  was neg for any acute changes. Troponin and D dimer both neg. EKG non ischemic. no orthostatic hypotension. Check  Echocardiogram. Appreciated  cardiology consult. May need event monitor on discharge     2. SYKES (dyspnea on exertion) / Chest pain (2023) / Bradycardia (2023): Hx of CAD( MI about 10 years ago), strong family Hx of CAD at younger age. check echo. Cardiology consulted. Hold BB due to bradycardia     3. HTN (hypertension), benign (2023) / Hyperlipidemia (2023): check a lipid panel. Hold BB         Subjective:     Chief Complaint[de-identified] Patient was seen and examined as a follow up for recurrent syncope. Chart was reviewed. c/o dizziness, SOB    ROS:  (bold if positive, if negative)    Tolerating PT  Tolerating Diet        Objective:     Last 24hrs VS reviewed since prior progress note.  Most recent are:    Visit Vitals  /70 (BP 1 Location: Right upper arm, BP Patient Position: Lying left side)   Pulse (!) 55   Temp 98 °F (36.7 °C)   Resp 13   SpO2 93%     SpO2 Readings from Last 6 Encounters:   23 93%   23 96%   23 94%   21 97%          Intake/Output Summary (Last 24 hours) at 2023 1145  Last data filed at 2023 1123  Gross per 24 hour   Intake 526.25 ml   Output 2150 ml   Net -1623.75 ml        Physical Exam:    Gen:  Well-developed, well-nourished, in no acute distress  HEENT:  Pink conjunctivae, PERRL, hearing intact to voice, moist mucous membranes  Neck:  Supple, without masses, thyroid non-tender  Resp:  No accessory muscle use, clear breath sounds without wheezes rales or rhonchi  Card:  No murmurs, normal S1, S2 without thrills, bruits or peripheral edema  Abd:  Soft, non-tender, non-distended, normoactive bowel sounds are present, no palpable organomegaly and no detectable hernias  Lymph:  No cervical or inguinal adenopathy  Musc:  No cyanosis or clubbing  Skin:  No rashes or ulcers, skin turgor is good  Neuro:  Cranial nerves are grossly intact, no focal motor weakness, follows commands appropriately  Psych:  Good insight, oriented to person, place and time, alert  __________________________________________________________________  Medications Reviewed: (see below)  Medications:     Current Facility-Administered Medications   Medication Dose Route Frequency    sodium chloride (NS) flush 5-40 mL  5-40 mL IntraVENous Q8H    sodium chloride (NS) flush 5-40 mL  5-40 mL IntraVENous PRN    acetaminophen (TYLENOL) tablet 650 mg  650 mg Oral Q6H PRN    Or    acetaminophen (TYLENOL) suppository 650 mg  650 mg Rectal Q6H PRN    polyethylene glycol (MIRALAX) packet 17 g  17 g Oral DAILY PRN    senna (SENOKOT) tablet 8.6 mg  1 Tablet Oral DAILY PRN    promethazine (PHENERGAN) tablet 12.5 mg  12.5 mg Oral Q6H PRN    Or    ondansetron (ZOFRAN) injection 4 mg  4 mg IntraVENous Q6H PRN    enoxaparin (LOVENOX) injection 30 mg  30 mg SubCUTAneous Q12H    0.9% sodium chloride infusion  75 mL/hr IntraVENous CONTINUOUS     Current Outpatient Medications   Medication Sig    lisinopriL (PRINIVIL, ZESTRIL) 20 mg tablet Take 1 Tablet by mouth daily. AM medication    amLODIPine (NORVASC) 5 mg tablet Take 1 Tablet by mouth daily. AM med    metoprolol succinate (TOPROL-XL) 50 mg XL tablet Take 1 Tablet by mouth daily. AM med    simvastatin (ZOCOR) 40 mg tablet Take 1 Tablet by mouth nightly. PM med    Omeprazole delayed release (PRILOSEC D/R) 20 mg tablet Take 1 Tablet by mouth daily.  AM med    dicyclomine (BENTYL) 20 mg tablet Take 1 Tablet by mouth every six (6) hours as needed for Abdominal Cramps. buprenorphine-naloxone 8-2 mg subl 0.5 Tablets by SubLINGual route three (3) times daily. Max Daily Amount: 1.5 Tablets. Lab Data Reviewed: (see below)  Lab Review:     Recent Labs     04/06/23  0539 04/05/23  1337 04/04/23  1051   WBC 7.4 7.3 8.4   HGB 13.3 13.3 13.7   HCT 38.2 37.8 39.1    251 250     Recent Labs     04/06/23  0539 04/05/23  1337 04/04/23  1051    138 137   K 4.0 4.0 3.9   * 108 106   CO2 25 27 28   GLU 97 87 125*   BUN 13 11 12   CREA 0.87 0.87 1.00   CA 8.3* 8.5 8.6   MG  --   --  2.0   ALB 3.2* 3.5 3.6   TBILI 0.6 0.4 0.4   ALT 19 23 24     No results found for: GLUCPOC  No results for input(s): PH, PCO2, PO2, HCO3, FIO2 in the last 72 hours. No results for input(s): INR, INREXT in the last 72 hours. All Micro Results       None            I have reviewed notes of prior 24hr. Other pertinent lab: Total time: -35- minutes **I personally saw and examined the patient during this time period**    I personally reviewed chart, notes, data and current medications in the medical record. I have personally examined and treated the patient at bedside during this period.                  Care Plan discussed with: Patient, Nursing Staff, and >50% of time spent in counseling and coordination of care    Discussed:  Care Plan    Prophylaxis:  Lovenox    Disposition:  Home w/Family           ___________________________________________________    Attending Physician: Jill Gong MD

## 2023-04-06 NOTE — PROGRESS NOTES
CARE MANAGEMENT NOTE      Pt admitted on 4/5/23 for syncope and collapse. CM attempted to complete initial assessment, however, Pt was having a procedure done at this time.  CM will follow up when Pt is available.       _____________________________________  Lu Thompson, [address] [facility] Management   Available via [facility]  4/6/2023   2:56 PM

## 2023-04-06 NOTE — CONSULTS
[address] [facility]                       Cardiology Care Note     [x]Initial Encounter     []Follow-up    Patient Name: Lyndon Horton - PATT:[id number]  Primary Cardiologist:   Consulting Cardiologist: [facility] Cardiology Physicians: Sage Resendiz MD     Reason for encounter:  Syncope, bradycardia     HPI:     Mr. Anoop Churchill is a 36 y.o. male who is being admitted for recurrent episodes of syncope and collapse. He says he has history of MI at age 31. Does not see a cardiologist (lives in [place]). Past few weeks he has had increased SYKES, chest pain and lightheadedness leading to syncope. He gets lightheaded sitting up and then standing up. Says he has passed out 4 times past few weeks. Unable to do much without SYKES. He says he had a cardiac cath about 13 years ago and was told he had no obstruction. He has a strong family hx of CAD. HR ~ 50 bpm in ED (is on Toprol XL 50 mg daily at home)     Nurse noted \"Patient reported he almost passed out after going to the bathroom. Felt dizzy while taking orthostatic BP. \"    A few days ago he thought he had some blood in stool - workup in ED was OK    He is from [place] and working in [place] on a [employer]     EKG, trop, proBNP normal in ED. Tele with sinus kasey, otherwise OK.         Assessment and Plan       1) SYKES / Chest pain   - he has had class 3 SYKES and increased CP lately   - EKG, trop, proBNP OK thus far  - check an echo   - Given CAD risk, check a cardiac cath tomorrow    2) Recurrent syncope   - Along with increased SYKES, he's had lightheadedness just sitting / standing up associated with recurrent syncope (x4) past few weeks   - Of note, trop - D-dimer, TSH all normal   - He felt dizzy during orthostatic vitals, however BP OK standing   - His pulse is in 50's without any significant bradycardia / pauses on tele --> still will hold his home BB (Toprol XL 50 mg) for now   - Check an echo and cath as above   - Consider 2 week event monitor outpatient if workup remains unremarkable   - I told him [facility] policy of no driving 6 months s/p syncope    3) HTN  - Hold home meds (norvasc,lisinopril, Toprol XL) and follow orthostatic vitals given orthostatic complaints     4) Smoking cessation advised          ____________________________________________________________    Cardiac testing    Telemetry - sinus - HR ~ 50 bpm     EKG 4/5/23 - NSR, normal     CT head 4/1/23 - no acute process   CTA Abd 4/1/23 - No evidence of active GI bleeding. No acute intra-abdominal pathology. CXR 4/6/23 - Mild interstitial prominence          Past Medical History:  Past Medical History:   Diagnosis Date    Heart attack ([facility] [address]     At age 31 per patient    HTN (hypertension), benign     Hyperlipidemia     Obesity     Smoking        Social Hx:  reports that he has been smoking cigarettes. He uses smokeless tobacco. He reports current alcohol use. He reports that he does not currently use drugs. Family Hx: family history includes Heart Disease in his brother and mother.          Review of Systems:    [x]All other systems reviewed and all negative except as written in HPI    [] Patient unable to provide secondary to condition          OBJECTIVE:  Wt Readings from Last 3 Encounters:   04/04/23 230 lb (104.3 kg)   04/01/23 230 lb (104.3 kg)   07/11/21 230 lb (104.3 kg)       Intake/Output Summary (Last 24 hours) at 4/6/2023 1207  Last data filed at 4/6/2023 1123  Gross per 24 hour   Intake 526.25 ml   Output 2150 ml   Net -1623.75 ml       Physical Exam:    Vitals:   Vitals:    04/06/23 0855 04/06/23 0856 04/06/23 0858 04/06/23 1105   BP: 131/81 124/85 (!) 126/92 107/70   Pulse: 65 71 66 (!) 55   Resp:    13   Temp:    98 °F (36.7 °C)   SpO2:    93%       Gen: Well-developed, well-nourished, in no acute distress  Neck: Supple, No JVD, No Carotid Bruit  Resp: No accessory muscle use, Clear breath sounds, No rales or rhonchi  Card: Regular Rate,Rythm, Normal S1, S2, No murmurs, rubs or gallop. No thrills. Abd:   Soft, non-tender, non-distended, BS+   MSK: No cyanosis  Skin: No rashes    Neuro: Moving all four extremities, follows commands appropriately  Psych: Good insight, oriented to person, place, alert, Nml Affect  LE: No edema    Data Review:     Labs:  Recent Results (from the past 24 hour(s))   EKG, 12 LEAD, INITIAL    Collection Time: 04/05/23  1:20 PM   Result Value Ref Range    Ventricular Rate 61 BPM    Atrial Rate 61 BPM    P-R Interval 144 ms    QRS Duration 86 ms    Q-T Interval 402 ms    QTC Calculation (Bezet) 404 ms    Calculated P Axis 36 degrees    Calculated R Axis 19 degrees    Calculated T Axis 24 degrees    Diagnosis       ** Poor data quality, interpretation may be adversely affected  Normal sinus rhythm  Normal ECG  When compared with ECG of 04-APR-2023 10:42,  MANUAL COMPARISON REQUIRED, DATA IS UNCONFIRMED     CBC WITH AUTOMATED DIFF    Collection Time: 04/05/23  1:37 PM   Result Value Ref Range    WBC 7.3 4.1 - 11.1 K/uL    RBC 4.51 4.10 - 5.70 M/uL    HGB 13.3 12.1 - 17.0 g/dL    HCT 37.8 36.6 - 50.3 %    MCV 83.8 80.0 - 99.0 FL    MCH 29.5 26.0 - 34.0 PG    MCHC 35.2 30.0 - 36.5 g/dL    RDW 12.0 11.5 - 14.5 %    PLATELET 514 949 - 271 K/uL    MPV 11.0 8.9 - 12.9 FL    NRBC 0.0 0  WBC    ABSOLUTE NRBC 0.00 0.00 - 0.01 K/uL    NEUTROPHILS 61 32 - 75 %    LYMPHOCYTES 25 12 - 49 %    MONOCYTES 9 5 - 13 %    EOSINOPHILS 4 0 - 7 %    BASOPHILS 1 0 - 1 %    IMMATURE GRANULOCYTES 0 0.0 - 0.5 %    ABS. NEUTROPHILS 4.5 1.8 - 8.0 K/UL    ABS. LYMPHOCYTES 1.8 0.8 - 3.5 K/UL    ABS. MONOCYTES 0.6 0.0 - 1.0 K/UL    ABS. EOSINOPHILS 0.3 0.0 - 0.4 K/UL    ABS. BASOPHILS 0.1 0.0 - 0.1 K/UL    ABS. IMM.  GRANS. 0.0 0.00 - 0.04 K/UL    DF AUTOMATED     METABOLIC PANEL, COMPREHENSIVE    Collection Time: 04/05/23  1:37 PM   Result Value Ref Range    Sodium 138 136 - 145 mmol/L    Potassium 4.0 3.5 - 5.1 mmol/L Chloride 108 97 - 108 mmol/L    CO2 27 21 - 32 mmol/L    Anion gap 3 (L) 5 - 15 mmol/L    Glucose 87 65 - 100 mg/dL    BUN 11 6 - 20 MG/DL    Creatinine 0.87 0.70 - 1.30 MG/DL    BUN/Creatinine ratio 13 12 - 20      eGFR >60 >60 ml/min/1.73m2    Calcium 8.5 8.5 - 10.1 MG/DL    Bilirubin, total 0.4 0.2 - 1.0 MG/DL    ALT (SGPT) 23 12 - 78 U/L    AST (SGOT) 11 (L) 15 - 37 U/L    Alk. phosphatase 71 45 - 117 U/L    Protein, total 6.4 6.4 - 8.2 g/dL    Albumin 3.5 3.5 - 5.0 g/dL    Globulin 2.9 2.0 - 4.0 g/dL    A-G Ratio 1.2 1.1 - 2.2     NT-PRO BNP    Collection Time: 04/05/23  1:37 PM   Result Value Ref Range    NT pro-BNP 55 <125 PG/ML   TROPONIN-HIGH SENSITIVITY    Collection Time: 04/05/23  1:37 PM   Result Value Ref Range    Troponin-High Sensitivity 5 0 - 76 ng/L   SAMPLES BEING HELD    Collection Time: 04/05/23  1:37 PM   Result Value Ref Range    SAMPLES BEING HELD 1 BLUE, 1 RED, 1 SST     COMMENT        Add-on orders for these samples will be processed based on acceptable specimen integrity and analyte stability, which may vary by analyte. TSH 3RD GENERATION    Collection Time: 04/05/23  1:37 PM   Result Value Ref Range    TSH 1.55 0.36 - 4.61 uIU/mL   METABOLIC PANEL, COMPREHENSIVE    Collection Time: 04/06/23  5:39 AM   Result Value Ref Range    Sodium 140 136 - 145 mmol/L    Potassium 4.0 3.5 - 5.1 mmol/L    Chloride 112 (H) 97 - 108 mmol/L    CO2 25 21 - 32 mmol/L    Anion gap 3 (L) 5 - 15 mmol/L    Glucose 97 65 - 100 mg/dL    BUN 13 6 - 20 MG/DL    Creatinine 0.87 0.70 - 1.30 MG/DL    BUN/Creatinine ratio 15 12 - 20      eGFR >60 >60 ml/min/1.73m2    Calcium 8.3 (L) 8.5 - 10.1 MG/DL    Bilirubin, total 0.6 0.2 - 1.0 MG/DL    ALT (SGPT) 19 12 - 78 U/L    AST (SGOT) 16 15 - 37 U/L    Alk.  phosphatase 62 45 - 117 U/L    Protein, total 6.2 (L) 6.4 - 8.2 g/dL    Albumin 3.2 (L) 3.5 - 5.0 g/dL    Globulin 3.0 2.0 - 4.0 g/dL    A-G Ratio 1.1 1.1 - 2.2     CBC WITH AUTOMATED DIFF    Collection Time: 04/06/23  5:39 AM   Result Value Ref Range    WBC 7.4 4.1 - 11.1 K/uL    RBC 4.50 4. 10 - 5.70 M/uL    HGB 13.3 12.1 - 17.0 g/dL    HCT 38.2 36.6 - 50.3 %    MCV 84.9 80.0 - 99.0 FL    MCH 29.6 26.0 - 34.0 PG    MCHC 34.8 30.0 - 36.5 g/dL    RDW 12.0 11.5 - 14.5 %    PLATELET 765 479 - 476 K/uL    MPV 11.1 8.9 - 12.9 FL    NRBC 0.0 0  WBC    ABSOLUTE NRBC 0.00 0.00 - 0.01 K/uL    NEUTROPHILS 55 32 - 75 %    LYMPHOCYTES 32 12 - 49 %    MONOCYTES 8 5 - 13 %    EOSINOPHILS 4 0 - 7 %    BASOPHILS 1 0 - 1 %    IMMATURE GRANULOCYTES 0 0.0 - 0.5 %    ABS. NEUTROPHILS 4.0 1.8 - 8.0 K/UL    ABS. LYMPHOCYTES 2.4 0.8 - 3.5 K/UL    ABS. MONOCYTES 0.6 0.0 - 1.0 K/UL    ABS. EOSINOPHILS 0.3 0.0 - 0.4 K/UL    ABS. BASOPHILS 0.1 0.0 - 0.1 K/UL    ABS. IMM. GRANS. 0.0 0.00 - 0.04 K/UL    DF AUTOMATED     LIPID PANEL    Collection Time: 04/06/23  5:39 AM   Result Value Ref Range    Cholesterol, total 192 <200 MG/DL    Triglyceride 210 (H) <150 MG/DL    HDL Cholesterol 52 MG/DL    LDL, calculated 98 0 - 100 MG/DL    VLDL, calculated 42 MG/DL    CHOL/HDL Ratio 3.7 0.0 - 5.0               Current medications and allergies:     Allergy:  No Known Allergies      Current Facility-Administered Medications:     sodium chloride (NS) flush 5-40 mL, 5-40 mL, IntraVENous, Q8H, Catherine Inman MD, 10 mL at 04/05/23 2100    sodium chloride (NS) flush 5-40 mL, 5-40 mL, IntraVENous, PRN, Catherine Inman MD    acetaminophen (TYLENOL) tablet 650 mg, 650 mg, Oral, Q6H PRN **OR** acetaminophen (TYLENOL) suppository 650 mg, 650 mg, Rectal, Q6H PRN, Catherine Inman MD    polyethylene glycol (MIRALAX) packet 17 g, 17 g, Oral, DAILY PRN, Catherine Inman MD    senna (SENOKOT) tablet 8.6 mg, 1 Tablet, Oral, DAILY PRN, Catherine Inman MD    promethazine (PHENERGAN) tablet 12.5 mg, 12.5 mg, Oral, Q6H PRN **OR** ondansetron (ZOFRAN) injection 4 mg, 4 mg, IntraVENous, Q6H PRN, Catherine Inman MD    enoxaparin (LOVENOX) injection 30 mg, 30 mg, SubCUTAneous, Q12H, Jonh Townsend MD, 30 mg at 04/06/23 0854    0.9% sodium chloride infusion, 75 mL/hr, IntraVENous, CONTINUOUS, John Townsend MD, Last Rate: 75 mL/hr at 04/05/23 2059, 75 mL/hr at 04/05/23 2059    Current Outpatient Medications:     lisinopriL (PRINIVIL, ZESTRIL) 20 mg tablet, Take 1 Tablet by mouth daily. AM medication, Disp: , Rfl:     amLODIPine (NORVASC) 5 mg tablet, Take 1 Tablet by mouth daily. AM med, Disp: , Rfl:     metoprolol succinate (TOPROL-XL) 50 mg XL tablet, Take 1 Tablet by mouth daily. AM med, Disp: , Rfl:     simvastatin (ZOCOR) 40 mg tablet, Take 1 Tablet by mouth nightly. PM med, Disp: , Rfl:     Omeprazole delayed release (PRILOSEC D/R) 20 mg tablet, Take 1 Tablet by mouth daily. AM med, Disp: , Rfl:     dicyclomine (BENTYL) 20 mg tablet, Take 1 Tablet by mouth every six (6) hours as needed for Abdominal Cramps., Disp: 20 Tablet, Rfl: 0    buprenorphine-naloxone 8-2 mg subl, 0.5 Tablets by SubLINGual route three (3) times daily. Max Daily Amount: 1.5 Tablets. , Disp: , Rfl:         Iron Green MD    [facility] Cardiology  Call center: (I) [phone]  (R) [phone]      CC: Unknown, Provider, MD

## 2023-04-06 NOTE — PROGRESS NOTES
TRANSFER - IN REPORT:    Verbal report received from [place](name) on Jay Morales  being received from [facility](unit) for routine progression of care      Report consisted of patients Situation, Background, Assessment and   Recommendations(SBAR). Information from the following report(s) SBAR, Kardex, Med Rec Status, and Cardiac Rhythm Sinus kasey  was reviewed with the receiving nurse. Opportunity for questions and clarification was provided. Assessment completed upon patients arrival to unit and care assumed.

## 2023-04-06 NOTE — PROGRESS NOTES
TRANSFER - OUT REPORT:    Verbal report given to [place], RN (name) on Humble Mendez  being transferred to [facility] (unit) for routine progression of care       Report consisted of patients Situation, Background, Assessment and   Recommendations(SBAR). Information from the following report(s) Cardiac Rhythm sinus kasey  was reviewed with the receiving nurse. Lines:   Peripheral IV 04/05/23 Left Antecubital (Active)   Site Assessment Clean, dry, & intact 04/06/23 0800   Phlebitis Assessment 0 04/06/23 0800   Infiltration Assessment 0 04/06/23 0800   Dressing Status Clean, dry, & intact 04/06/23 0800   Dressing Type Transparent 04/06/23 0800   Hub Color/Line Status Green;End cap changed 04/06/23 0800   Action Taken Open ports on tubing capped 04/06/23 0800   Alcohol Cap Used Yes 04/06/23 0800        Opportunity for questions and clarification was provided.       Patient transported with:   Monitor

## 2023-04-06 NOTE — PROGRESS NOTES
Patient reported he almost passed out after going to the bathroom. Felt dizzy while taking orthostatic BP.

## 2023-04-07 ENCOUNTER — APPOINTMENT (OUTPATIENT)
Dept: CT IMAGING | Age: 43
End: 2023-04-07
Attending: INTERNAL MEDICINE
Payer: COMMERCIAL

## 2023-04-07 ENCOUNTER — APPOINTMENT (OUTPATIENT)
Dept: NON INVASIVE DIAGNOSTICS | Age: 43
End: 2023-04-07
Attending: SPECIALIST
Payer: COMMERCIAL

## 2023-04-07 PROCEDURE — APPSS30 APP SPLIT SHARED TIME 16-30 MINUTES: Performed by: NURSE PRACTITIONER

## 2023-04-07 RX ADMIN — ACETAMINOPHEN 650 MG: 325 TABLET ORAL at 17:43

## 2023-04-07 RX ADMIN — SODIUM CHLORIDE, PRESERVATIVE FREE 10 ML: 5 INJECTION INTRAVENOUS at 22:49

## 2023-04-07 RX ADMIN — SODIUM CHLORIDE 75 ML/HR: 9 INJECTION, SOLUTION INTRAVENOUS at 03:42

## 2023-04-07 NOTE — PROGRESS NOTES
1900  Bedside and Verbal shift change report given to Alma Thurston RN (oncoming nurse) by David Aburto RN (offgoing nurse). Report included the following information SBAR, Kardex, Intake/Output, MAR, Recent Results, and Cardiac Rhythm NSR/SB . 2130: lovenox pulled dose not have measurements on outside of syringe and order is for partial package. Contacted pharmacy who will modify order for correct dosage. Pharmacy to tube up new dose and old dose wasted in pyxis. This patient was assisted with Intentional Toileting every 2 hours during this shift as appropriate. Documentation of ambulation and output reflected on Flowsheet as appropriate. Purposeful hourly rounding was completed using AIDET and 5Ps. Outcomes of PHR documented as they occurred. Bed alarm in use as appropriate. Dual Suction and ambubag in place. 0700  Bedside and Verbal shift change report given to Manohar Durant (oncoming nurse) by Alma Thurston RN (offgoing nurse). Report included the following information SBAR, Kardex, Intake/Output, MAR, Recent Results, and Cardiac Rhythm NSR/SB .

## 2023-04-08 RX ADMIN — ENOXAPARIN SODIUM 30 MG: 100 INJECTION SUBCUTANEOUS at 08:15

## 2023-04-08 RX ADMIN — SODIUM CHLORIDE, PRESERVATIVE FREE 10 ML: 5 INJECTION INTRAVENOUS at 05:50

## 2023-04-08 RX ADMIN — ACETAMINOPHEN 650 MG: 325 TABLET ORAL at 03:25

## 2023-04-12 ENCOUNTER — HOSPITAL ENCOUNTER (INPATIENT)
Age: 43
LOS: 2 days | Discharge: HOME | DRG: 313 | End: 2023-04-14
Attending: EMERGENCY MEDICINE | Admitting: INTERNAL MEDICINE
Payer: COMMERCIAL

## 2023-04-12 ENCOUNTER — APPOINTMENT (OUTPATIENT)
Dept: GENERAL RADIOLOGY | Age: 43
DRG: 313 | End: 2023-04-12
Attending: EMERGENCY MEDICINE
Payer: COMMERCIAL

## 2023-04-12 ENCOUNTER — APPOINTMENT (OUTPATIENT)
Dept: CT IMAGING | Age: 43
DRG: 313 | End: 2023-04-12
Attending: INTERNAL MEDICINE
Payer: COMMERCIAL

## 2023-04-12 DIAGNOSIS — R11.0 NAUSEA: ICD-10-CM

## 2023-04-12 DIAGNOSIS — I21.4 NSTEMI (NON-ST ELEVATED MYOCARDIAL INFARCTION) (HCC): Primary | ICD-10-CM

## 2023-04-12 DIAGNOSIS — R53.1 WEAKNESS GENERALIZED: ICD-10-CM

## 2023-04-12 DIAGNOSIS — R55 SYNCOPE AND COLLAPSE: ICD-10-CM

## 2023-04-12 LAB
ALBUMIN SERPL-MCNC: 4 G/DL (ref 3.5–5)
ALBUMIN/GLOB SERPL: 1.2 (ref 1.1–2.2)
ALP SERPL-CCNC: 74 U/L (ref 45–117)
ALT SERPL-CCNC: 40 U/L (ref 12–78)
ANION GAP SERPL CALC-SCNC: 3 MMOL/L (ref 5–15)
AST SERPL-CCNC: 20 U/L (ref 15–37)
BASOPHILS # BLD: 0.1 K/UL (ref 0–0.1)
BASOPHILS NFR BLD: 1 % (ref 0–1)
BILIRUB SERPL-MCNC: 0.6 MG/DL (ref 0.2–1)
BNP SERPL-MCNC: 18 PG/ML
BUN SERPL-MCNC: 11 MG/DL (ref 6–20)
BUN/CREAT SERPL: 10 (ref 12–20)
CALCIUM SERPL-MCNC: 9.3 MG/DL (ref 8.5–10.1)
CHLORIDE SERPL-SCNC: 104 MMOL/L (ref 97–108)
CO2 SERPL-SCNC: 28 MMOL/L (ref 21–32)
COMMENT, HOLDF: NORMAL
CREAT SERPL-MCNC: 1.06 MG/DL (ref 0.7–1.3)
D DIMER PPP FEU-MCNC: 0.19 MG/L FEU (ref 0–0.65)
DIFFERENTIAL METHOD BLD: NORMAL
EOSINOPHIL # BLD: 0.2 K/UL (ref 0–0.4)
EOSINOPHIL NFR BLD: 2 % (ref 0–7)
ERYTHROCYTE [DISTWIDTH] IN BLOOD BY AUTOMATED COUNT: 11.9 % (ref 11.5–14.5)
GLOBULIN SER CALC-MCNC: 3.4 G/DL (ref 2–4)
GLUCOSE SERPL-MCNC: 104 MG/DL (ref 65–100)
HCT VFR BLD AUTO: 40.1 % (ref 36.6–50.3)
HGB BLD-MCNC: 14.1 G/DL (ref 12.1–17)
IMM GRANULOCYTES # BLD AUTO: 0 K/UL (ref 0–0.04)
IMM GRANULOCYTES NFR BLD AUTO: 0 % (ref 0–0.5)
LYMPHOCYTES # BLD: 1.8 K/UL (ref 0.8–3.5)
LYMPHOCYTES NFR BLD: 18 % (ref 12–49)
MCH RBC QN AUTO: 29.2 PG (ref 26–34)
MCHC RBC AUTO-ENTMCNC: 35.2 G/DL (ref 30–36.5)
MCV RBC AUTO: 83 FL (ref 80–99)
MONOCYTES # BLD: 0.9 K/UL (ref 0–1)
MONOCYTES NFR BLD: 9 % (ref 5–13)
NEUTS SEG # BLD: 7 K/UL (ref 1.8–8)
NEUTS SEG NFR BLD: 70 % (ref 32–75)
NRBC # BLD: 0 K/UL (ref 0–0.01)
NRBC BLD-RTO: 0 PER 100 WBC
PLATELET # BLD AUTO: 264 K/UL (ref 150–400)
PMV BLD AUTO: 10.8 FL (ref 8.9–12.9)
POTASSIUM SERPL-SCNC: 4 MMOL/L (ref 3.5–5.1)
PROT SERPL-MCNC: 7.4 G/DL (ref 6.4–8.2)
RBC # BLD AUTO: 4.83 M/UL (ref 4.1–5.7)
SAMPLES BEING HELD,HOLD: NORMAL
SODIUM SERPL-SCNC: 135 MMOL/L (ref 136–145)
TROPONIN I SERPL HS-MCNC: 213 NG/L (ref 0–76)
TROPONIN I SERPL HS-MCNC: 227 NG/L (ref 0–76)
WBC # BLD AUTO: 10 K/UL (ref 4.1–11.1)

## 2023-04-12 PROCEDURE — 85025 COMPLETE CBC W/AUTO DIFF WBC: CPT

## 2023-04-12 PROCEDURE — 74011250636 HC RX REV CODE- 250/636: Performed by: INTERNAL MEDICINE

## 2023-04-12 PROCEDURE — 93005 ELECTROCARDIOGRAM TRACING: CPT

## 2023-04-12 PROCEDURE — 84484 ASSAY OF TROPONIN QUANT: CPT

## 2023-04-12 PROCEDURE — 71046 X-RAY EXAM CHEST 2 VIEWS: CPT

## 2023-04-12 PROCEDURE — 99285 EMERGENCY DEPT VISIT HI MDM: CPT

## 2023-04-12 PROCEDURE — 85379 FIBRIN DEGRADATION QUANT: CPT

## 2023-04-12 PROCEDURE — 74011250637 HC RX REV CODE- 250/637

## 2023-04-12 PROCEDURE — 71275 CT ANGIOGRAPHY CHEST: CPT

## 2023-04-12 PROCEDURE — 83880 ASSAY OF NATRIURETIC PEPTIDE: CPT

## 2023-04-12 PROCEDURE — 74011250637 HC RX REV CODE- 250/637: Performed by: INTERNAL MEDICINE

## 2023-04-12 PROCEDURE — 74011000636 HC RX REV CODE- 636: Performed by: INTERNAL MEDICINE

## 2023-04-12 PROCEDURE — 36415 COLL VENOUS BLD VENIPUNCTURE: CPT

## 2023-04-12 PROCEDURE — 65270000029 HC RM PRIVATE

## 2023-04-12 PROCEDURE — 74011000250 HC RX REV CODE- 250: Performed by: INTERNAL MEDICINE

## 2023-04-12 PROCEDURE — 80053 COMPREHEN METABOLIC PANEL: CPT

## 2023-04-12 RX ORDER — ENOXAPARIN SODIUM 100 MG/ML
30 INJECTION SUBCUTANEOUS EVERY 12 HOURS
Status: DISCONTINUED | OUTPATIENT
Start: 2023-04-13 | End: 2023-04-14 | Stop reason: HOSPADM

## 2023-04-12 RX ORDER — MORPHINE SULFATE 2 MG/ML
2 INJECTION, SOLUTION INTRAMUSCULAR; INTRAVENOUS
Status: DISCONTINUED | OUTPATIENT
Start: 2023-04-12 | End: 2023-04-14 | Stop reason: HOSPADM

## 2023-04-12 RX ORDER — SODIUM CHLORIDE 0.9 % (FLUSH) 0.9 %
5-40 SYRINGE (ML) INJECTION EVERY 8 HOURS
Status: DISCONTINUED | OUTPATIENT
Start: 2023-04-12 | End: 2023-04-14 | Stop reason: HOSPADM

## 2023-04-12 RX ORDER — LISINOPRIL 20 MG/1
20 TABLET ORAL DAILY
Status: DISCONTINUED | OUTPATIENT
Start: 2023-04-13 | End: 2023-04-14 | Stop reason: HOSPADM

## 2023-04-12 RX ORDER — ROSUVASTATIN CALCIUM 10 MG/1
20 TABLET, COATED ORAL
Status: DISCONTINUED | OUTPATIENT
Start: 2023-04-12 | End: 2023-04-14 | Stop reason: HOSPADM

## 2023-04-12 RX ORDER — GUAIFENESIN 100 MG/5ML
81 LIQUID (ML) ORAL DAILY
Status: DISCONTINUED | OUTPATIENT
Start: 2023-04-13 | End: 2023-04-14 | Stop reason: HOSPADM

## 2023-04-12 RX ORDER — SODIUM CHLORIDE 0.9 % (FLUSH) 0.9 %
5-40 SYRINGE (ML) INJECTION AS NEEDED
Status: DISCONTINUED | OUTPATIENT
Start: 2023-04-12 | End: 2023-04-14 | Stop reason: HOSPADM

## 2023-04-12 RX ORDER — PANTOPRAZOLE SODIUM 40 MG/1
40 TABLET, DELAYED RELEASE ORAL
Status: DISCONTINUED | OUTPATIENT
Start: 2023-04-13 | End: 2023-04-14 | Stop reason: HOSPADM

## 2023-04-12 RX ORDER — PROMETHAZINE HYDROCHLORIDE 25 MG/1
12.5 TABLET ORAL
Status: DISCONTINUED | OUTPATIENT
Start: 2023-04-12 | End: 2023-04-14 | Stop reason: HOSPADM

## 2023-04-12 RX ORDER — ACETAMINOPHEN 325 MG/1
650 TABLET ORAL
Status: DISCONTINUED | OUTPATIENT
Start: 2023-04-12 | End: 2023-04-14 | Stop reason: HOSPADM

## 2023-04-12 RX ORDER — POLYETHYLENE GLYCOL 3350 17 G/17G
17 POWDER, FOR SOLUTION ORAL DAILY PRN
Status: DISCONTINUED | OUTPATIENT
Start: 2023-04-12 | End: 2023-04-14 | Stop reason: HOSPADM

## 2023-04-12 RX ORDER — ACETAMINOPHEN 650 MG/1
650 SUPPOSITORY RECTAL
Status: DISCONTINUED | OUTPATIENT
Start: 2023-04-12 | End: 2023-04-14 | Stop reason: HOSPADM

## 2023-04-12 RX ORDER — LANOLIN ALCOHOL/MO/W.PET/CERES
3 CREAM (GRAM) TOPICAL ONCE
Status: COMPLETED | OUTPATIENT
Start: 2023-04-12 | End: 2023-04-12

## 2023-04-12 RX ORDER — NITROGLYCERIN 0.4 MG/1
0.4 TABLET SUBLINGUAL AS NEEDED
Status: DISCONTINUED | OUTPATIENT
Start: 2023-04-12 | End: 2023-04-14 | Stop reason: HOSPADM

## 2023-04-12 RX ORDER — AMLODIPINE BESYLATE 5 MG/1
5 TABLET ORAL DAILY
Status: DISCONTINUED | OUTPATIENT
Start: 2023-04-13 | End: 2023-04-14 | Stop reason: HOSPADM

## 2023-04-12 RX ORDER — ONDANSETRON 2 MG/ML
4 INJECTION INTRAMUSCULAR; INTRAVENOUS
Status: DISCONTINUED | OUTPATIENT
Start: 2023-04-12 | End: 2023-04-14 | Stop reason: HOSPADM

## 2023-04-12 RX ADMIN — TICAGRELOR 90 MG: 90 TABLET ORAL at 17:49

## 2023-04-12 RX ADMIN — IOPAMIDOL 100 ML: 755 INJECTION, SOLUTION INTRAVENOUS at 18:17

## 2023-04-12 RX ADMIN — MORPHINE SULFATE 2 MG: 2 INJECTION, SOLUTION INTRAMUSCULAR; INTRAVENOUS at 19:22

## 2023-04-12 RX ADMIN — ROSUVASTATIN CALCIUM 20 MG: 10 TABLET, FILM COATED ORAL at 23:05

## 2023-04-12 RX ADMIN — Medication 10 ML: at 22:00

## 2023-04-12 RX ADMIN — Medication 3 MG: at 23:29

## 2023-04-12 NOTE — ED NOTES
Bedside and Verbal shift change report given to [address] (oncoming nurse) by Lazara HAHN (offgoing nurse). Report included the following information SBAR, Kardex, ED Summary, Intake/Output, and Recent Results.

## 2023-04-12 NOTE — CONSULTS
Non-ischemic ecg. Atypical CV symptoms. Recommend admission to hospitalist.  Trend troponin. Cont DAPT. Defer anticoagulation therapy at this time.

## 2023-04-12 NOTE — ED PROVIDER NOTES
Mr. Queen Coleman is a 45yo male who presents to the ER with complaints of fatigue and dizziness. He said that he had a stent placed in his heart last week. He saw his been feeling similar symptoms but also somewhat different that he had prior to requiring a stent. He also reports generalized fatigue. He said that when he tries to walk his legs feel achy and weak and he feels tired. He also reports mild shortness of breath. No chest pain. No fevers or chills. He has had nausea every day since he had a stent placed but no vomiting. He denies any other complaints. Past Medical History:   Diagnosis Date    Heart attack [facility])     At age 31 per patient    HTN (hypertension), benign     Hyperlipidemia     Obesity     Smoking        No past surgical history on file. Family History:   Problem Relation Age of Onset    Heart Disease Mother     Heart Disease Brother        Social History     Socioeconomic History    Marital status:      Spouse name: Not on file    Number of children: Not on file    Years of education: Not on file    Highest education level: Not on file   Occupational History    Not on file   Tobacco Use    Smoking status: Every Day     Types: Cigarettes    Smokeless tobacco: Current   Vaping Use    Vaping Use: Not on file   Substance and Sexual Activity    Alcohol use: Yes    Drug use: Not Currently    Sexual activity: Not on file   Other Topics Concern    Not on file   Social History Narrative    Not on file     Social Determinants of Health     Financial Resource Strain: Not on file   Food Insecurity: Not on file   Transportation Needs: Not on file   Physical Activity: Not on file   Stress: Not on file   Social Connections: Not on file   Intimate Partner Violence: Not on file   Housing Stability: Not on file         ALLERGIES: Patient has no known allergies. Review of Systems   Constitutional:  Positive for fatigue. Negative for chills and fever.    HENT:  Negative for rhinorrhea and sore throat. Respiratory:  Positive for shortness of breath. Negative for cough. Cardiovascular:  Negative for chest pain. Gastrointestinal:  Negative for abdominal pain, diarrhea, nausea and vomiting. Genitourinary:  Negative for dysuria and hematuria. Musculoskeletal:  Negative for arthralgias and myalgias. Skin:  Negative for pallor and rash. Neurological:  Positive for dizziness and weakness. All other systems reviewed and are negative. Vitals:    04/12/23 1351   BP: 134/61   Pulse: 68   Resp: 18   Temp: 98.2 °F (36.8 °C)   SpO2: 98%   Weight: 100.2 kg (221 lb)   Height: 6' (1.829 m)            Physical Exam     Vital signs reviewed. Nursing notes reviewed. Const:  No acute distress, well developed, well nourished  Head:  Atraumatic, normocephalic  Eyes:  PERRL, conjunctiva normal, no scleral icterus  Neck:  Supple, trachea midline  Cardiovascular: Normal rate  Resp:  No resp distress, no increased work of breathing  Abd:  Soft, non-tender, non-distended  MSK:  No pedal edema, normal ROM  Neuro:  Alert and oriented x3, no cranial nerve defect  Skin:  Warm, dry, intact  Psych: normal mood and affect, behavior is normal, judgement and thought content is normal          Medical Decision Making  Amount and/or Complexity of Data Reviewed  External Data Reviewed: notes. Labs: ordered. Radiology: ordered. ECG/medicine tests: ordered. ED Course as of 04/12/23 1453   Wed Apr 12, 2023   1407 EKG interpretation: (Preliminary)  Rhythm: normal sinus rhythm; and regular . Rate (approx.): 65; Axis: normal; P wave: normal; QRS interval: normal ; ST/T wave: normal; Other findings: normal   [FD]      ED Course User Index  [FD] Dima Mckoy MD       Procedures        2:53 PM  I spoke with Dr. Orly Bledsoe about Mr. Arsen Raman. He recommends getting to troponins. If those are stable, then he should follow-up with cardiology.

## 2023-04-12 NOTE — ED TRIAGE NOTES
Pt arrives for tightness and weakness to extremities. States nausea and overall doesn't \"feel right\"  Pt states stent placed in main artery two days ago.    States not feeling normal since procedure

## 2023-04-12 NOTE — ED NOTES
3:58 PM   High sensitivity troponin is elevated, Cardiology consulted. Patient is being admitted to the hospital.  The results of their tests and reasons for their admission have been discussed with them and/or available family. They convey agreement and understanding for the need to be admitted and for their admission diagnosis. Consultation will be made now with the inpatient physician for hospitalization. Perfect Serve Consult for Admission  4:44 PM    ED Room Number: D26/D26  Patient Name and age:  Mimi Hughes 36 y.o.  male  Working Diagnosis:   1.  NSTEMI (non-ST elevated myocardial infarction) ([facility] [address]      COVID-19 Suspicion:  no  Sepsis present:  no  Reassessment needed: no  Code Status:  Full Code  Readmission: yes  Isolation Requirements:  no  Recommended Level of Care:  telemetry  Department: [facility] ED - [phone]  Admitting Provider: Dr. Claus Smith

## 2023-04-12 NOTE — ACP (ADVANCE CARE PLANNING)
Advance Care Planning     Advance Care Planning (ACP) Physician/NP/PA Conversation      Date of Conversation: 4/12/2023  Conducted with: Patient with Decision Making Capacity    Healthcare Decision Maker:   No healthcare decision makers have been documented. Click here to complete [address] including selection of the Healthcare Decision Maker Relationship (ie \"Primary\")  Today we documented Decision Maker(s) consistent with Legal Next of Kin hierarchy. Care Preferences:    Hospitalization: \"If your health worsens and it becomes clear that your chance of recovery is unlikely, what would be your preference regarding hospitalization? \"  The patient would prefer hospitalization. Ventilation: \"If you were unable to breathe on your own and your chance of recovery was unlikely, what would be your preference about the use of a ventilator (breathing machine) if it was available to you? \"   The patient would desire the use of a ventilator. Resuscitation: \"In the event your heart stopped as a result of an underlying serious health condition, would you want attempts to be made to restart your heart, or would you prefer a natural death? \"   Yes, attempt to resuscitate. Additional topics discussed: ventilation preferences and resuscitation preferences    Conversation Outcomes / Follow-Up Plan:   ACP complete - no further action today  Reviewed DNR/DNI and patient elects Full Code (Attempt Resuscitation)       Patient is a 44-year-old gentleman admitted for NSTEMI with a recent known NSTEMI with GUEVARA to LAD due to 90% occlusion last week. He is back in the ER with chest pain and a sense of impending doom. A discussion was held regarding his work-up, treatment, and resuscitation preferences.   Patient is currently full code    Length of Voluntary ACP Conversation in minutes:  16 minutes    Lauro Post MD

## 2023-04-12 NOTE — H&P
History and Physical    Date of Service:  4/12/2023  Primary Care Provider: Unknown, Provider, MD  Source of information: The patient, Chart review, and Spouse/family member    Chief Complaint: Dizziness, Shortness of Breath, and Extremity Weakness      History of Presenting Illness:   Marycarmen Moise is a 36 y.o. male with a past medical history of hypertension, hyperlipidemia, recent admission for bradycardia and syncope found to have an 80-90% stenosis of the LAD now s/p GUEVARA on 4/7, who presented to the emergency room due to chest tightness, arm numbness, and \"not feeling right. \"    Patient states that ever since before he had his procedure he has had a funny feeling in his chest.  The feeling has persisted, and has now become quite uncomfortable. He states that it is a tightness, squeezing, but unlike his symptoms when he first came in. He also endorses having some numbness in his arms. Patient emphatically states multiple times that he does not know \"what it is\" but that the nausea, intermittent numbness, and pressure are just part of it, and that he has had a progressively worsening since that he is going to die if we do not figure something out. He states he is on buprenorphine, and that he has smoked cigarettes since his stent but because of the severe worsening feeling he got after smoking he has not smoked again in 2 days. He does not use any illicit drugs but he does use kratom, approximately 2 bottles a day. In the emergency room patient was found to have an elevated troponin at 221, and cardiology was contacted. Due to symptoms being atypical and recommended admission to medicine without a drip, but to continue dual antiplatelet therapy.     The patient denies any headache, blurry vision, sore throat, trouble swallowing, trouble with speech, chest pain, SOB, cough, fever, chills, N/V/D, abd pain, urinary symptoms, constipation, recent travels, sick contacts, focal or generalized neurological symptoms, falls, injuries, rashes, contact with COVID-19 diagnosed patients, hematemesis, melena, hemoptysis, hematuria, rashes, denies starting any new medications and denies any other concerns or problems besides as mentioned above. REVIEW OF SYSTEMS:  A comprehensive review of systems was negative except for that written in the History of Present Illness. Past Medical History:   Diagnosis Date    Heart attack [facility])     At age 31 per patient    HTN (hypertension), benign     Hyperlipidemia     Obesity     Smoking       No past surgical history on file. Prior to Admission medications    Medication Sig Start Date End Date Taking? Authorizing Provider   ticagrelor (BRILINTA) 90 mg tablet Take 1 Tablet by mouth every twelve (12) hours. 4/8/23   Forrest Lomeli MD   rosuvastatin (CRESTOR) 20 mg tablet Take 1 Tablet by mouth nightly for 90 days. 4/7/23 7/6/23  Moshe Mccormick NP   aspirin 81 mg chewable tablet Take 1 Tablet by mouth daily. May purchase over the counter 4/8/23 4/7/24  Jai Davenport NP   lisinopriL (PRINIVIL, ZESTRIL) 20 mg tablet Take 1 Tablet by mouth daily. AM medication    Sally Condon MD   amLODIPine (NORVASC) 5 mg tablet Take 1 Tablet by mouth daily. AM med    Sally Condon MD   Omeprazole delayed release (PRILOSEC D/R) 20 mg tablet Take 1 Tablet by mouth daily. AM med    Sally Condon MD   buprenorphine-naloxone 8-2 mg subl 0.5 Tablets by SubLINGual route three (3) times daily. 3/8/23   Sally Condon MD     No Known Allergies   Family History   Problem Relation Age of Onset    Heart Disease Mother     Heart Disease Brother       Social History:  reports that he has been smoking cigarettes. He uses smokeless tobacco. He reports current alcohol use. He reports that he does not currently use drugs.    Social Determinants of Health     Tobacco Use: High Risk    Smoking Tobacco Use: Every Day    Smokeless Tobacco Use: Current    Passive Exposure: Not on file   Alcohol Use: Not on file   Financial Resource Strain: Not on file   Food Insecurity: Not on file   Transportation Needs: Not on file   Physical Activity: Not on file   Stress: Not on file   Social Connections: Not on file   Intimate Partner Violence: Not on file   Depression: Not at risk    PHQ-2 Score: 0   Housing Stability: Not on file        Medications were reconciled to the best of my ability given all available resources at the time of admission. Route is PO if not otherwise noted. Family and social history were personally reviewed, all pertinent and relevant details are outlined as above.     Objective:   Visit Vitals  /61   Pulse 68   Temp 98.2 °F (36.8 °C)   Resp 18   Ht 6' (1.829 m)   Wt 100.2 kg (221 lb)   SpO2 98%   BMI 29.97 kg/m²      O2 Device: None (Room air)    PHYSICAL EXAM:   General: Alert x oriented x 3, awake, no acute distress,   HEENT: PEERL, EOMI, moist mucus membranes  Neck: Supple, no JVD, no meningeal signs  Chest: Clear to auscultation bilaterally   CVS: RRR, S1 S2 heard, no murmurs/rubs/gallops  Abd: Soft, non-tender, non-distended, +bowel sounds   Ext: No clubbing, no cyanosis, no edema  Neuro/Psych: Pleasant mood and affect, CN 2-12 grossly intact, sensory grossly within normal limit, Strength 5/5 in all extremities, DTR 1+ x 4  Cap refill: Brisk, less than 3 seconds  Pulses: 2+, symmetric in all extremities  Skin: Warm, dry, without rashes or lesions    Data Review:   I have independently reviewed and interpreted patient's lab and all other diagnostic data    Abnormal Labs Reviewed   METABOLIC PANEL, COMPREHENSIVE - Abnormal; Notable for the following components:       Result Value    Sodium 135 (*)     Anion gap 3 (*)     Glucose 104 (*)     BUN/Creatinine ratio 10 (*)     All other components within normal limits   TROPONIN-HIGH SENSITIVITY - Abnormal; Notable for the following components:    Troponin-High Sensitivity 227 (*)     All other components within normal limits       All Micro Results       None            IMAGING:   XR CHEST PA LAT   Final Result      No evidence of acute process. ECG/ECHO:    Results for orders placed or performed during the hospital encounter of 04/12/23   EKG, 12 LEAD, INITIAL   Result Value Ref Range    Ventricular Rate 65 BPM    Atrial Rate 65 BPM    P-R Interval 146 ms    QRS Duration 82 ms    Q-T Interval 386 ms    QTC Calculation (Bezet) 401 ms    Calculated P Axis 64 degrees    Calculated R Axis 47 degrees    Calculated T Axis 73 degrees    Diagnosis       Normal sinus rhythm  Normal ECG  When compared with ECG of 07-APR-2023 16:48,  No significant change was found            Notes reviewed from all clinical/nonclinical/nursing services involved in patient's clinical care. Care coordination discussions were held with appropriate clinical/nonclinical/ nursing providers based on care coordination needs. Assessment:   Given the patient's current clinical presentation, there is a high level of concern for decompensation if discharged from the emergency department. Complex decision making was performed, which includes reviewing the patient's available past medical records, laboratory results, and imaging studies. Active Problems:    NSTEMI (non-ST elevated myocardial infarction) ([address] (4/12/2023)        Plan:     Ace Yeboah is a 36 y.o. male with a past medical history of hypertension, hyperlipidemia, recent admission for bradycardia and syncope found to have an 80-90% stenosis of the LAD now s/p GUEVARA on 4/7, who presented to the emergency room due to chest tightness, arm numbness, and \"not feeling right. \"    Elevated troponin with atypica chest pain  -- Positive troponin in the setting of atypical chest pressure/pain  -- Comorbidities including hypertension, hyperlipidemia,  and recent stent placement to LAD  -- Continue trending troponins  -- Dual antiplatelet therapy  -- Morphine as needed  -- Cardiology consulted  -- Echo deferred to cardiology  -- CT Angiogram r/o PE  -- Vital signs per unit routine  -- Continuous telemetry, EKGs, trend troponin  -- Routine labs    Hypertension, POA  --Currently normotensive  --Continue home meds  --Vital signs as per unit routine    Hyperlipidemia, POA  --Check lipid panel  --Continue statin    GERD, POA  --Continue Protonix    Tobacco abuse, POA  --Actively smokes  --Not interested in quitting  --Patient was provided 10 minutes of smoking cessation counseling  --Nicotine patch offered and declined    DIET: ADULT DIET Regular   ISOLATION PRECAUTIONS: There are currently no Active Isolations  CODE STATUS: Full Code   DVT PROPHYLAXIS: Lovenox  FUNCTIONAL STATUS PRIOR TO HOSPITALIZATION: Fully active and ambulatory; able to carry on all self-care without restriction. Ambulatory status/function: By self   EARLY MOBILITY ASSESSMENT: Recommend routine ambulation while hospitalized with the assistance of nursing staff  ANTICIPATED DISCHARGE: 24-48 hours. ANTICIPATED DISPOSITION: Home  EMERGENCY CONTACT/SURROGATE DECISION MAKER: DAVONTE Herrera Signs [phone]    CRITICAL CARE WAS PERFORMED FOR THIS ENCOUNTER: NO.      Signed By: Yong Goldsmith MD     April 12, 2023         Please note that this dictation may have been completed with Tonie Fournier, the computer voice recognition software. Quite often unanticipated grammatical, syntax, homophones, and other interpretive errors are inadvertently transcribed by the computer software. Please disregard these errors. Please excuse any errors that have escaped final proofreading.

## 2023-04-13 ENCOUNTER — APPOINTMENT (OUTPATIENT)
Dept: MRI IMAGING | Age: 43
DRG: 313 | End: 2023-04-13
Attending: NURSE PRACTITIONER
Payer: COMMERCIAL

## 2023-04-13 ENCOUNTER — APPOINTMENT (OUTPATIENT)
Dept: NON INVASIVE DIAGNOSTICS | Age: 43
DRG: 313 | End: 2023-04-13
Attending: NURSE PRACTITIONER
Payer: COMMERCIAL

## 2023-04-13 LAB
ALBUMIN SERPL-MCNC: 3.6 G/DL (ref 3.5–5)
ALBUMIN/GLOB SERPL: 1.1 (ref 1.1–2.2)
ALP SERPL-CCNC: 69 U/L (ref 45–117)
ALT SERPL-CCNC: 36 U/L (ref 12–78)
AMPHET UR QL SCN: NEGATIVE
ANION GAP SERPL CALC-SCNC: 2 MMOL/L (ref 5–15)
AST SERPL-CCNC: 16 U/L (ref 15–37)
BARBITURATES UR QL SCN: NEGATIVE
BASOPHILS # BLD: 0.1 K/UL (ref 0–0.1)
BASOPHILS NFR BLD: 1 % (ref 0–1)
BENZODIAZ UR QL: NEGATIVE
BILIRUB SERPL-MCNC: 0.5 MG/DL (ref 0.2–1)
BUN SERPL-MCNC: 13 MG/DL (ref 6–20)
BUN/CREAT SERPL: 13 (ref 12–20)
CALCIUM SERPL-MCNC: 8.7 MG/DL (ref 8.5–10.1)
CANNABINOIDS UR QL SCN: NEGATIVE
CHLORIDE SERPL-SCNC: 105 MMOL/L (ref 97–108)
CO2 SERPL-SCNC: 29 MMOL/L (ref 21–32)
COCAINE UR QL SCN: NEGATIVE
CREAT SERPL-MCNC: 1.04 MG/DL (ref 0.7–1.3)
DIFFERENTIAL METHOD BLD: ABNORMAL
DRUG SCRN COMMENT,DRGCM: ABNORMAL
ECHO AO ASC DIAM: 3.2 CM
ECHO AO ASCENDING AORTA INDEX: 1.44 CM/M2
ECHO LA DIAMETER INDEX: 1.53 CM/M2
ECHO LA DIAMETER: 3.4 CM
ECHO LA VOL 2C: 50 ML (ref 18–58)
ECHO LA VOL 4C: 54 ML (ref 18–58)
ECHO LA VOL BP: 53 ML (ref 18–58)
ECHO LA VOL/BSA BIPLANE: 24 ML/M2 (ref 16–34)
ECHO LA VOLUME AREA LENGTH: 57 ML
ECHO LA VOLUME INDEX A2C: 23 ML/M2 (ref 16–34)
ECHO LA VOLUME INDEX A4C: 24 ML/M2 (ref 16–34)
ECHO LA VOLUME INDEX AREA LENGTH: 26 ML/M2 (ref 16–34)
ECHO LV EDV A2C: 109 ML
ECHO LV EDV A4C: 133 ML
ECHO LV EDV BP: 124 ML (ref 67–155)
ECHO LV EDV INDEX A4C: 60 ML/M2
ECHO LV EDV INDEX BP: 56 ML/M2
ECHO LV EDV NDEX A2C: 49 ML/M2
ECHO LV EJECTION FRACTION A2C: 64 %
ECHO LV EJECTION FRACTION A4C: 64 %
ECHO LV EJECTION FRACTION BIPLANE: 64 % (ref 55–100)
ECHO LV ESV A2C: 39 ML
ECHO LV ESV A4C: 48 ML
ECHO LV ESV BP: 44 ML (ref 22–58)
ECHO LV ESV INDEX A2C: 18 ML/M2
ECHO LV ESV INDEX A4C: 22 ML/M2
ECHO LV ESV INDEX BP: 20 ML/M2
ECHO LV FRACTIONAL SHORTENING: 24 % (ref 28–44)
ECHO LV INTERNAL DIMENSION DIASTOLE INDEX: 2.3 CM/M2
ECHO LV INTERNAL DIMENSION DIASTOLIC: 5.1 CM (ref 4.2–5.9)
ECHO LV INTERNAL DIMENSION SYSTOLIC INDEX: 1.76 CM/M2
ECHO LV INTERNAL DIMENSION SYSTOLIC: 3.9 CM
ECHO LV IVSD: 1.1 CM (ref 0.6–1)
ECHO LV MASS 2D: 227.4 G (ref 88–224)
ECHO LV MASS INDEX 2D: 102.4 G/M2 (ref 49–115)
ECHO LV POSTERIOR WALL DIASTOLIC: 1.2 CM (ref 0.6–1)
ECHO LV RELATIVE WALL THICKNESS RATIO: 0.47
ECHO LVOT AREA: 2.8 CM2
ECHO LVOT DIAM: 1.9 CM
EOSINOPHIL # BLD: 0.3 K/UL (ref 0–0.4)
EOSINOPHIL NFR BLD: 4 % (ref 0–7)
ERYTHROCYTE [DISTWIDTH] IN BLOOD BY AUTOMATED COUNT: 11.8 % (ref 11.5–14.5)
GLOBULIN SER CALC-MCNC: 3.3 G/DL (ref 2–4)
GLUCOSE SERPL-MCNC: 94 MG/DL (ref 65–100)
HCT VFR BLD AUTO: 39.6 % (ref 36.6–50.3)
HGB BLD-MCNC: 13.8 G/DL (ref 12.1–17)
IMM GRANULOCYTES # BLD AUTO: 0 K/UL (ref 0–0.04)
IMM GRANULOCYTES NFR BLD AUTO: 1 % (ref 0–0.5)
LYMPHOCYTES # BLD: 2.2 K/UL (ref 0.8–3.5)
LYMPHOCYTES NFR BLD: 25 % (ref 12–49)
MAGNESIUM SERPL-MCNC: 2 MG/DL (ref 1.6–2.4)
MCH RBC QN AUTO: 29 PG (ref 26–34)
MCHC RBC AUTO-ENTMCNC: 34.8 G/DL (ref 30–36.5)
MCV RBC AUTO: 83.2 FL (ref 80–99)
METHADONE UR QL: NEGATIVE
MONOCYTES # BLD: 0.9 K/UL (ref 0–1)
MONOCYTES NFR BLD: 10 % (ref 5–13)
NEUTS SEG # BLD: 5.3 K/UL (ref 1.8–8)
NEUTS SEG NFR BLD: 59 % (ref 32–75)
NRBC # BLD: 0 K/UL (ref 0–0.01)
NRBC BLD-RTO: 0 PER 100 WBC
OPIATES UR QL: POSITIVE
PCP UR QL: NEGATIVE
PLATELET # BLD AUTO: 237 K/UL (ref 150–400)
PMV BLD AUTO: 11.1 FL (ref 8.9–12.9)
POTASSIUM SERPL-SCNC: 4 MMOL/L (ref 3.5–5.1)
PROT SERPL-MCNC: 6.9 G/DL (ref 6.4–8.2)
RBC # BLD AUTO: 4.76 M/UL (ref 4.1–5.7)
SODIUM SERPL-SCNC: 136 MMOL/L (ref 136–145)
TROPONIN I SERPL HS-MCNC: 203 NG/L (ref 0–76)
WBC # BLD AUTO: 8.8 K/UL (ref 4.1–11.1)

## 2023-04-13 PROCEDURE — 36415 COLL VENOUS BLD VENIPUNCTURE: CPT

## 2023-04-13 PROCEDURE — 93325 DOPPLER ECHO COLOR FLOW MAPG: CPT | Performed by: INTERNAL MEDICINE

## 2023-04-13 PROCEDURE — 93005 ELECTROCARDIOGRAM TRACING: CPT

## 2023-04-13 PROCEDURE — 74011000250 HC RX REV CODE- 250: Performed by: INTERNAL MEDICINE

## 2023-04-13 PROCEDURE — 65270000046 HC RM TELEMETRY

## 2023-04-13 PROCEDURE — 93308 TTE F-UP OR LMTD: CPT

## 2023-04-13 PROCEDURE — 80307 DRUG TEST PRSMV CHEM ANLYZR: CPT

## 2023-04-13 PROCEDURE — 99223 1ST HOSP IP/OBS HIGH 75: CPT | Performed by: STUDENT IN AN ORGANIZED HEALTH CARE EDUCATION/TRAINING PROGRAM

## 2023-04-13 PROCEDURE — 74011250637 HC RX REV CODE- 250/637: Performed by: INTERNAL MEDICINE

## 2023-04-13 PROCEDURE — 83735 ASSAY OF MAGNESIUM: CPT

## 2023-04-13 PROCEDURE — 74011250636 HC RX REV CODE- 250/636: Performed by: INTERNAL MEDICINE

## 2023-04-13 PROCEDURE — 84484 ASSAY OF TROPONIN QUANT: CPT

## 2023-04-13 PROCEDURE — 74011250637 HC RX REV CODE- 250/637

## 2023-04-13 PROCEDURE — 80053 COMPREHEN METABOLIC PANEL: CPT

## 2023-04-13 PROCEDURE — 85025 COMPLETE CBC W/AUTO DIFF WBC: CPT

## 2023-04-13 PROCEDURE — 95816 EEG AWAKE AND DROWSY: CPT | Performed by: PSYCHIATRY & NEUROLOGY

## 2023-04-13 PROCEDURE — 74011250637 HC RX REV CODE- 250/637: Performed by: NURSE PRACTITIONER

## 2023-04-13 PROCEDURE — 99222 1ST HOSP IP/OBS MODERATE 55: CPT | Performed by: PSYCHIATRY & NEUROLOGY

## 2023-04-13 PROCEDURE — 93308 TTE F-UP OR LMTD: CPT | Performed by: INTERNAL MEDICINE

## 2023-04-13 RX ORDER — MORPHINE SULFATE 2 MG/ML
2 INJECTION, SOLUTION INTRAMUSCULAR; INTRAVENOUS ONCE
Status: COMPLETED | OUTPATIENT
Start: 2023-04-13 | End: 2023-04-13

## 2023-04-13 RX ORDER — LORAZEPAM 1 MG/1
1 TABLET ORAL
Status: COMPLETED | OUTPATIENT
Start: 2023-04-13 | End: 2023-04-13

## 2023-04-13 RX ORDER — LORAZEPAM 2 MG/ML
1 INJECTION INTRAMUSCULAR ONCE
Status: COMPLETED | OUTPATIENT
Start: 2023-04-13 | End: 2023-04-13

## 2023-04-13 RX ORDER — PRASUGREL 10 MG/1
60 TABLET, FILM COATED ORAL ONCE
Status: DISCONTINUED | OUTPATIENT
Start: 2023-04-13 | End: 2023-04-13

## 2023-04-13 RX ORDER — PRASUGREL 10 MG/1
10 TABLET, FILM COATED ORAL DAILY
Status: DISCONTINUED | OUTPATIENT
Start: 2023-04-14 | End: 2023-04-13

## 2023-04-13 RX ADMIN — MORPHINE SULFATE 2 MG: 2 INJECTION, SOLUTION INTRAMUSCULAR; INTRAVENOUS at 06:27

## 2023-04-13 RX ADMIN — MORPHINE SULFATE 2 MG: 2 INJECTION, SOLUTION INTRAMUSCULAR; INTRAVENOUS at 14:53

## 2023-04-13 RX ADMIN — Medication 10 ML: at 22:15

## 2023-04-13 RX ADMIN — MORPHINE SULFATE 2 MG: 2 INJECTION, SOLUTION INTRAMUSCULAR; INTRAVENOUS at 12:04

## 2023-04-13 RX ADMIN — MORPHINE SULFATE 2 MG: 2 INJECTION, SOLUTION INTRAMUSCULAR; INTRAVENOUS at 09:35

## 2023-04-13 RX ADMIN — LORAZEPAM 1 MG: 1 TABLET ORAL at 09:21

## 2023-04-13 RX ADMIN — TICAGRELOR 90 MG: 90 TABLET ORAL at 17:08

## 2023-04-13 RX ADMIN — AMLODIPINE BESYLATE 5 MG: 5 TABLET ORAL at 09:21

## 2023-04-13 RX ADMIN — PANTOPRAZOLE SODIUM 40 MG: 40 TABLET, DELAYED RELEASE ORAL at 09:21

## 2023-04-13 RX ADMIN — ENOXAPARIN SODIUM 30 MG: 100 INJECTION SUBCUTANEOUS at 20:03

## 2023-04-13 RX ADMIN — Medication 10 ML: at 14:00

## 2023-04-13 RX ADMIN — MORPHINE SULFATE 2 MG: 2 INJECTION, SOLUTION INTRAMUSCULAR; INTRAVENOUS at 20:03

## 2023-04-13 RX ADMIN — TICAGRELOR 90 MG: 90 TABLET ORAL at 06:29

## 2023-04-13 RX ADMIN — ENOXAPARIN SODIUM 30 MG: 100 INJECTION SUBCUTANEOUS at 09:21

## 2023-04-13 RX ADMIN — LISINOPRIL 20 MG: 20 TABLET ORAL at 09:21

## 2023-04-13 RX ADMIN — ASPIRIN 81 MG: 81 TABLET, CHEWABLE ORAL at 09:21

## 2023-04-13 RX ADMIN — LORAZEPAM 1 MG: 2 INJECTION INTRAMUSCULAR; INTRAVENOUS at 16:04

## 2023-04-13 RX ADMIN — ROSUVASTATIN CALCIUM 20 MG: 10 TABLET, FILM COATED ORAL at 22:15

## 2023-04-13 NOTE — ROUTINE PROCESS
Per hospital protocol, pt must wait 6 weeks for MRI scan post Stent placement. Implant Card reviewed. Exam canceled.

## 2023-04-13 NOTE — PROGRESS NOTES
4/13/2023  3:36 PM  CM attempted to met w/ pt for DC planning, currently off the floor, will attempt again   ANKUR Linda

## 2023-04-13 NOTE — ED NOTES
Pt notified RN about increased dull pain in left chest that was radiating to his shoulder and stated it felt different than last night. Pt also endorses feeling flushed.     [id number]: RN EKG performed and gave dose of morphine. 3427: RN notified Yola Pennington NP of patient condition.

## 2023-04-13 NOTE — ED NOTES
Assumed care of pt at this time. Pt a+o. C/o mild left sided chest pressure, unchanged from previous shift. Will medicate for pain per emar. Updated pt on plan of care. Full set of vitals obtained and placed in emar.

## 2023-04-13 NOTE — CONSULTS
[facility]                    Cardiology Care Note     [x]Initial Encounter     []Follow-up    Patient Name: Ovidio Moreno - LXJ:[id number]  Primary Cardiologist: [address] [place] [address] Cardiology Physicians: Kevin Councilman MD  Consulting Cardiologist: [address] [place] [address] Cardiology Physicians: Liam Xavier DO     Reason for encounter: elevated troponin      HPI:       Eh Feliz is a 36 y.o. male with PMH significant for hypertension and tobacco abuse recently presented with chest pain shortness of breath and syncopal episodes. Underwent coronary evaluation with cardiac catheterization which showed acute coronary syndrome within his LAD. Treated with drug-eluting stent. Patient was reporting some visual changes after procedure. CTA did not show evidence of any significant cerebrovascular disease. Cardiac MRI was deferred due to recent stenting. Patient reports he continues to have persistent dizziness and feels weak. Reporting left axillary chest pain. On arrival his troponin was minimally elevated and flat. EKG without evidence of ischemia    Subjective:      Eh Feliz reports chest pain. Assessment and Plan       Vestibular/ vertigo symptoms. Pre-syncope, recent syncope. Main ongoing symptoms is that he feels weak and dizzy whenever he is standing up. Reports that he is unable to function due to feeling weak in his legs when standing. Main complaint on previous and current presentation. Minimal elevation in troponin related to PCI without evidence of ongoing ischemia, patient is NOT having acute myocardial infarction. Current chest pain symptoms are atypical in nature and different than prior presenting chest pain symptoms. Post PCI angiography showed VICKI-3 flow into all vessels without evidence of any flow-limiting dissection by angiography or IVUS. IVUS showed well opposed stent.   Doubt his current chest pain symptoms are related to acute stent failure. Hypertension      -Recommend to proceed with MRI of his brain to exclude any significant etiology for his vestibular symptoms  -cont dapt w. brilinta  -Continue aspirin  -Continue amlodipine and lisinopril therapy, may need to down titrate if he is demonstrating orthostasis. -Recommend limited echo to reassess wall motion       ____________________________________________________________    Cardiac testing  04/05/23    ECHO ADULT COMPLETE 04/06/2023 4/6/2023    Interpretation Summary    Left Ventricle: Normal left ventricular systolic function. EF by 2D Simpsons Biplane is 61%. Left ventricle size is normal. Mildly increased wall thickness. Normal wall motion. Normal diastolic function. Signed by: Tahir Whittington MD on 4/6/2023  2:36 PM        04/05/23    CARDIAC PROCEDURE 04/07/2023 4/7/2023    Conclusion  · Single vessel CAD with lipid rich plaque with 80-90% stenosis of mid-LAD treated with JUMANA  · Elevated lvedp    Findings:  L Main:large caliber, minimal disease  LAD: large caliber, mid-lad distal to diagonals with 80-90% stenosis, two large diagonals w/o signfiicant disease  LCx: moderate caliber with small om, minimal disease  RCA: large caliber,large pda, large pl branch, minimal disease    LVEDP:  26 mmhg    PCI:  EBU 3.5, 6f guide  Heparin  Marquise blue into distal lad  Marquise blue into D2    Ivus  Direct stent with 3.0x15mm anirudh frontier jumana  Distal aspect of stent dilated with 3.5 nc, mid-aspect with 4.0 nc and proximally with 4.5 nc balloon.   Ivus shows apposed stent with msa >9mm2  No dissection or perforation    Signed by: Abi Lopez DO on 4/7/2023  4:48 PM        Most recent HS troponins:  Recent Labs     04/13/23  0259 04/12/23  1717 04/12/23  1400   TROPHS 203* 213* 227*       ECG:             Review of Systems:    [x]All other systems reviewed and all negative except as written in HPI    [] Patient unable to provide secondary to condition    Past Medical History:   Diagnosis Date    Heart attack [facility])     At age 31 per patient    HTN (hypertension), benign     Hyperlipidemia     Obesity     Smoking      No past surgical history on file. Social Hx:  reports that he has been smoking cigarettes. He uses smokeless tobacco. He reports current alcohol use. He reports that he does not currently use drugs. Family Hx: family history includes Heart Disease in his brother and mother. No Known Allergies       OBJECTIVE:  Wt Readings from Last 3 Encounters:   04/12/23 221 lb (100.2 kg)   04/11/23 221 lb (100.2 kg)   04/08/23 210 lb 8.6 oz (95.5 kg)     No intake or output data in the 24 hours ending 04/13/23 0842    Physical Exam:    Vitals:   Vitals:    04/13/23 0600 04/13/23 0615 04/13/23 0700 04/13/23 0730   BP:  115/77  111/70   Pulse: (!) 56 76 71 68   Resp: 11 24  16   Temp:    98.3 °F (36.8 °C)   SpO2: 98% 98%  99%   Weight:       Height:             Gen: Well-developed, well-nourished, in no acute distress  Neck: Supple, No JVD, No Carotid Bruit  Resp: No accessory muscle use, Clear breath sounds, No rales or rhonchi  Card: Regular Rate,Rythm, Normal S1, S2, No murmurs, rubs or gallop. No thrills. Abd:   Soft, non-tender, non-distended, BS+   MSK: No cyanosis  Skin: No rashes    Neuro: Moving all four extremities, follows commands appropriately  Psych: Good insight, oriented to person, place, alert, Nml Affect  LE: No edema    Data Review:     Radiology:   XR Results (most recent):  Results from Hospital Encounter encounter on 04/12/23    XR CHEST PA LAT    Narrative  EXAM: XR CHEST PA LAT    INDICATION: Chest pain    COMPARISON: 4/5/2023    TECHNIQUE: PA and lateral chest views    FINDINGS: The cardiac size is within normal limits. The pulmonary vasculature is  within normal limits. The lungs and pleural spaces are clear. The visualized bones and upper abdomen  are age-appropriate. Impression  No evidence of acute process.       Recent Labs 04/13/23  0259 04/12/23  1400    135*   K 4.0 4.0    104   CO2 29 28   BUN 13 11   CREA 1.04 1.06   GLU 94 104*   CA 8.7 9.3     Recent Labs     04/13/23  0259 04/12/23  1400   WBC 8.8 10.0   HGB 13.8 14.1   HCT 39.6 40.1    264     Recent Labs     04/13/23  0259 04/12/23  1400   AP 69 74     No results for input(s): CHOL, LDLC in the last 72 hours.     No lab exists for component: TGL, HDLC,  HBA1C      Current meds:    Current Facility-Administered Medications:     LORazepam (ATIVAN) tablet 1 mg, 1 mg, Oral, ONCE PRN, Bridgett Miller NP    prasugreL (EFFIENT) tablet 60 mg, 60 mg, Oral, ONCE, Jennifer Sheets NP    [START ON 4/14/2023] prasugreL (EFFIENT) tablet 10 mg, 10 mg, Oral, DAILY, Jennifer Sheets NP    sodium chloride (NS) flush 5-40 mL, 5-40 mL, IntraVENous, Q8H, Jose E Clemons MD, 10 mL at 04/12/23 2200    sodium chloride (NS) flush 5-40 mL, 5-40 mL, IntraVENous, PRN, Alejandrina Coleman MD    acetaminophen (TYLENOL) tablet 650 mg, 650 mg, Oral, Q6H PRN **OR** acetaminophen (TYLENOL) suppository 650 mg, 650 mg, Rectal, Q6H PRN, Jose E Clemons MD    polyethylene glycol (MIRALAX) packet 17 g, 17 g, Oral, DAILY PRN, Jose E Clemons MD    promethazine (PHENERGAN) tablet 12.5 mg, 12.5 mg, Oral, Q6H PRN **OR** ondansetron (ZOFRAN) injection 4 mg, 4 mg, IntraVENous, Q6H PRN, Jose E Clemons MD    enoxaparin (LOVENOX) injection 30 mg, 30 mg, SubCUTAneous, Q12H, Jose E Clemons MD    amLODIPine (NORVASC) tablet 5 mg, 5 mg, Oral, DAILY, Jose E Clemons MD    aspirin chewable tablet 81 mg, 81 mg, Oral, DAILY, Jose E Clemons MD    lisinopriL (PRINIVIL, ZESTRIL) tablet 20 mg, 20 mg, Oral, DAILY, Jose E Clemons MD    pantoprazole (PROTONIX) tablet 40 mg, 40 mg, Oral, ACB, Jose E Clemons MD    rosuvastatin (CRESTOR) tablet 20 mg, 20 mg, Oral, QHS, Jose E Clemons MD, 20 mg at 04/12/23 2305    nitroglycerin (NITROSTAT) tablet 0.4 mg, 0.4 mg, SubLINGual, PRN, Deonte Bambi Regan MD    morphine injection 2 mg, 2 mg, IntraVENous, Q3H PRN, Jeanan Prescott MD, 2 mg at 04/13/23 7319    Current Outpatient Medications:     ticagrelor (BRILINTA) 90 mg tablet, Take 1 Tablet by mouth every twelve (12) hours. , Disp: 60 Tablet, Rfl: 1    rosuvastatin (CRESTOR) 20 mg tablet, Take 1 Tablet by mouth nightly for 90 days. , Disp: 30 Tablet, Rfl: 2    aspirin 81 mg chewable tablet, Take 1 Tablet by mouth daily. May purchase over the counter, Disp: 30 Tablet, Rfl: 11    lisinopriL (PRINIVIL, ZESTRIL) 20 mg tablet, Take 1 Tablet by mouth daily. AM medication, Disp: , Rfl:     amLODIPine (NORVASC) 5 mg tablet, Take 1 Tablet by mouth daily. AM med, Disp: , Rfl:     Omeprazole delayed release (PRILOSEC D/R) 20 mg tablet, Take 1 Tablet by mouth daily. AM med, Disp: , Rfl:     buprenorphine-naloxone 8-2 mg subl, 0.5 Tablets by SubLINGual route three (3) times daily. , Disp: , Rfl:     Ana Flower, [address] [place] [address] Cardiology  Call center: (I) [phone]  (A) [phone]      CC: Unknown, Provider, MD

## 2023-04-13 NOTE — PROGRESS NOTES
Hospitalist Progress Note  Deland Goldmann, MD  Answering service: [phone] OR [phone] from in house phone        Date of Service:  2023  NAME:  Bouchra Pimentel  :  1980  MRN:  [id number]      Admission Summary/HPI:   Bouchra Pimentel is a 36 y.o. male with a past medical history of hypertension, hyperlipidemia, recent admission for bradycardia and syncope found to have an 80-90% stenosis of the LAD now s/p GUEVARA on , who presented to the emergency room due to chest tightness, arm numbness, and \"not feeling right. \"       Interval history / Subjective:   CTA negative. Patient c/o body aches ever since switching statins - have converted back to simvastatin. Also c/o vertigo/dizziness intermittently. MRI ordered, neuro consulted. EEG done. Patient complaining of pain.  Would like another one time dose of IV morphine     Assessment & Plan:      Elevated troponin with atypical chest pain  -- Positive troponin in the setting of atypical chest pressure/pain  -- Comorbidities of HTN/HLD and recent stent placement to LAD  -- Troponins Flat  -- Continue dual antiplatelet therapy  -- Morphine as needed  -- Cardiology consulted - IS NOT ACS  -- Echo - limited  -- CT Angiogram  negative for PE  -- Vital signs per unit routine  -- Continuous telemetry, EKGs, trend troponin  -- Routine labs    Vertigo/Dizziness  --Reporting symptoms today  --Neurology consulted  --EEG done  --MRI deferred until 6 weeks post stent placement     Hypertension, POA  --Currently normotensive  --Continue home meds  --Vital signs as per unit routine     Hyperlipidemia, POA  --Check lipid panel  --Continue statin     GERD, POA  --Continue Protonix     Tobacco abuse, POA  --Actively smokes  --Not interested in quitting  --Patient was provided 10 minutes of smoking cessation counseling  --Nicotine patch offered and declined        I have independently reviewed and interpreted patient's lab and other diagnostic data. External notes were reviewed  Critical Care Time: 0    Code status:Full  Prophylaxis: Heparin, SCD  Care Plan discussed with: Patient, RN, Multidisciplinary Rounds  Anticipated Disposition:      Hospital Problems  Date Reviewed: 4/11/2023            Codes Class Noted POA    NSTEMI (non-ST elevated myocardial infarction) [facility]) ICD-10-CM: I21.4  ICD-9-CM: 410.70  4/12/2023 Unknown               Review of Systems:   A comprehensive review of systems was negative except for that written in the HPI. Vital Signs:    Last 24hrs VS reviewed since prior progress note.  Most recent are:    Patient Vitals for the past 24 hrs:   Temp Pulse Resp BP SpO2   04/13/23 1458 98.4 °F (36.9 °C) 68 14 115/70 96 %   04/13/23 1340 -- 77 13 -- 94 %   04/13/23 1330 97.9 °F (36.6 °C) 77 19 114/70 96 %   04/13/23 1226 -- -- -- 111/70 --   04/13/23 1200 -- -- -- -- 98 %   04/13/23 0831 -- 68 16 111/70 97 %   04/13/23 0730 98.3 °F (36.8 °C) 68 16 111/70 99 %   04/13/23 0700 -- 71 -- -- --   04/13/23 0615 -- 76 24 115/77 98 %   04/13/23 0600 -- (!) 56 11 -- 98 %   04/13/23 0301 97.7 °F (36.5 °C) (!) 58 10 112/75 96 %   04/12/23 2306 98.7 °F (37.1 °C) 81 16 111/75 97 %   04/12/23 2253 -- (!) 59 -- -- --   04/12/23 2010 98.6 °F (37 °C) 68 12 112/77 95 %   04/12/23 1926 -- 69 12 -- 95 %   04/12/23 1748 -- 77 15 122/80 98 %   04/12/23 1739 -- 80 14 125/82 96 %        No intake or output data in the 24 hours ending 04/13/23 1729       Physical Examination:     I had a face to face encounter with this patient and independently examined them on 4/13/2023 as outlined below:          General : alert x 3, awake, no acute distress,   HEENT: PEERL, EOMI, moist mucus membrane, TM clear  Neck: supple, no JVD, no meningeal signs  Chest: Clear to auscultation bilaterally   CVS: S1 S2 heard, Capillary refill less than 2 seconds  Abd: soft/ non tender, non distended, BS physiological,   Ext: no clubbing, no cyanosis, no edema, brisk 2+ DP pulses  Neuro/Psych: pleasant mood and affect, CN 2-12 grossly intact, sensory grossly within normal limit, Strength 5/5 in all extremities, DTR 1+ x 4  Skin: warm            Data Review:    Review and/or order of clinical lab test  Review and/or order of tests in the radiology section of CPT  Review and/or order of tests in the medicine section of CPT  Discussion of test results with performing physician    I have independently reviewed and interpreted patient's lab and all other diagnostic data    Notes reviewed from all clinical/nonclinical/nursing services involved in patient's clinical care. Care coordination discussions were held with appropriate clinical/nonclinical/ nursing providers based on care coordination needs. CTA CHEST W OR W WO CONT    Result Date: 4/12/2023  There is no pulmonary embolism. There is no aortic aneurysm or dissection. Coronary artery stent. No acute intrathoracic process is identified. Incidental findings are as described above. Labs:     Recent Labs     04/13/23 0259 04/12/23  1400   WBC 8.8 10.0   HGB 13.8 14.1   HCT 39.6 40.1    264     Recent Labs     04/13/23  0259 04/12/23  1400    135*   K 4.0 4.0    104   CO2 29 28   BUN 13 11   CREA 1.04 1.06   GLU 94 104*   CA 8.7 9.3   MG 2.0  --      Recent Labs     04/13/23  0259 04/12/23  1400   ALT 36 40   AP 69 74   TBILI 0.5 0.6   TP 6.9 7.4   ALB 3.6 4.0   GLOB 3.3 3.4     No results for input(s): INR, PTP, APTT, INREXT in the last 72 hours. No results for input(s): FE, TIBC, PSAT, FERR in the last 72 hours. No results found for: FOL, RBCF   No results for input(s): PH, PCO2, PO2 in the last 72 hours. No results for input(s): CPK, CKNDX, TROIQ in the last 72 hours.     No lab exists for component: CPKMB  Lab Results   Component Value Date/Time    Cholesterol, total 192 04/06/2023 05:39 AM    HDL Cholesterol 52 04/06/2023 05:39 AM LDL, calculated 98 04/06/2023 05:39 AM    Triglyceride 210 (H) 04/06/2023 05:39 AM    CHOL/HDL Ratio 3.7 04/06/2023 05:39 AM     Lab Results   Component Value Date/Time    Glucose (POC) 96 04/07/2023 04:54 PM     Lab Results   Component Value Date/Time    Color YELLOW/STRAW 04/01/2023 01:45 PM    Appearance CLEAR 04/01/2023 01:45 PM    Specific gravity 1.017 04/01/2023 01:45 PM    pH (UA) 5.5 04/01/2023 01:45 PM    Protein Negative 04/01/2023 01:45 PM    Glucose Negative 04/01/2023 01:45 PM    Ketone Negative 04/01/2023 01:45 PM    Bilirubin Negative 04/01/2023 01:45 PM    Urobilinogen 0.2 04/01/2023 01:45 PM    Nitrites Negative 04/01/2023 01:45 PM    Leukocyte Esterase Negative 04/01/2023 01:45 PM    Epithelial cells FEW 04/01/2023 01:45 PM    Bacteria Negative 04/01/2023 01:45 PM    WBC 0-4 04/01/2023 01:45 PM    RBC 0-5 04/01/2023 01:45 PM         Medications Reviewed:     Current Facility-Administered Medications   Medication Dose Route Frequency    ticagrelor (BRILINTA) tablet 90 mg  90 mg Oral Q12H    sodium chloride (NS) flush 5-40 mL  5-40 mL IntraVENous Q8H    sodium chloride (NS) flush 5-40 mL  5-40 mL IntraVENous PRN    acetaminophen (TYLENOL) tablet 650 mg  650 mg Oral Q6H PRN    Or    acetaminophen (TYLENOL) suppository 650 mg  650 mg Rectal Q6H PRN    polyethylene glycol (MIRALAX) packet 17 g  17 g Oral DAILY PRN    promethazine (PHENERGAN) tablet 12.5 mg  12.5 mg Oral Q6H PRN    Or    ondansetron (ZOFRAN) injection 4 mg  4 mg IntraVENous Q6H PRN    enoxaparin (LOVENOX) injection 30 mg  30 mg SubCUTAneous Q12H    amLODIPine (NORVASC) tablet 5 mg  5 mg Oral DAILY    aspirin chewable tablet 81 mg  81 mg Oral DAILY    lisinopriL (PRINIVIL, ZESTRIL) tablet 20 mg  20 mg Oral DAILY    pantoprazole (PROTONIX) tablet 40 mg  40 mg Oral ACB    rosuvastatin (CRESTOR) tablet 20 mg  20 mg Oral QHS    nitroglycerin (NITROSTAT) tablet 0.4 mg  0.4 mg SubLINGual PRN    morphine injection 2 mg  2 mg IntraVENous Q3H PRN     ______________________________________________________________________  EXPECTED LENGTH OF STAY: 1d 16h  ACTUAL LENGTH OF STAY:          1                 Jeanette Morales MD

## 2023-04-13 NOTE — CONSULTS
Neurology Consult - Inpatient      Name:   Chiki Miller  MRN:    [id number]    Date of Admission:  4/12/2023    Date of Consultation:  04/13/23       HISTORY OF PRESENT ILLNESS:     This is a 36 y.o. male with  has a past medical history of Heart attack ([address] HTN (hypertension), benign, Hyperlipidemia, Obesity, and Smoking. .    Neurology is asked to see the patient for Syncope, persisting dizziness    Pt known to me from recent hospital admission/ consult dated 4-8-23. The impression and plan from that consult was as follows: 2 episodes of transient left visual field bright phenomenon, lasting several minutes each, without any associated headache, or migraine history. The first one occurred 1-2 days before cardiac cath, the second one occurred about 1 hour after cath. The first one had some associated rising warmth sensation throughout the body a/w nausea and odd feeling in head. DDx: right occipital lobe TIA vs ocular migraine (visual aura by itself, no headache) vs vasovagal episode vs simple partial seizure (without loss of awareness). Pt is neurologically stable and can be d/c home today. As pt cannot have MRI Brain at this time due to recent cardiac stent, I recommend he follow up in the Neurology Clinic in 6 weeks for reassessment and at that time I can order the brain MRI as well as order EEG. Counseled pt on smoking cessation. Continue antiplatelet medications and statin. Follow up in Neurology Clinic    He was discharged on Brilinta and ASA after his recent cath. Patient says that since discharge he has been having generalized fatigue, legs feel achy tired when walking, has episodes of sudden dyspnea generally not feeling well.   Denies any episodes of passing out, but says when he leans / bends forward, that seems to be a trigger for his symptoms    Labs:   CBC normal  CMP with mild low Na 135, Cr normal, GFR normal, LFTs normal  Elevated troponins  Repeat CMP and CBC normal  UDS pending    Brain MRI +/- contrast is pending      Complete Review of Systems: reviewed on admission H&P    ====================================     No Known Allergies    Current Outpatient Medications   Medication Instructions    amLODIPine (NORVASC) 5 mg, Oral, DAILY, AM med    aspirin 81 mg, Oral, DAILY, May purchase over the counter    buprenorphine-naloxone 8-2 mg subl 0.5 Tablets, SubLINGual, 3 TIMES DAILY    lisinopriL (PRINIVIL, ZESTRIL) 20 mg, Oral, DAILY, AM medication    Omeprazole delayed release (PRILOSEC D/R) 20 mg, Oral, DAILY, AM med    rosuvastatin (CRESTOR) 20 mg, Oral, EVERY BEDTIME    ticagrelor (BRILINTA) 90 mg, Oral, EVERY 12 HOURS       Current Facility-Administered Medications   Medication Dose Route Frequency Provider Last Rate Last Admin    ticagrelor (BRILINTA) tablet 90 mg  90 mg Oral Q12H Jennifer Sheets NP        LORazepam (ATIVAN) injection 1 mg  1 mg IntraVENous ONCE Vladimir Denis MD        sodium chloride (NS) flush 5-40 mL  5-40 mL IntraVENous Q8H Jesus FLANNERY MD   10 mL at 04/12/23 2200    sodium chloride (NS) flush 5-40 mL  5-40 mL IntraVENous PRN [place] Veliz MD        acetaminophen (TYLENOL) tablet 650 mg  650 mg Oral Q6H PRN [place] Veliz MD        Or    acetaminophen (TYLENOL) suppository 650 mg  650 mg Rectal Q6H PRN [place] Veliz MD        polyethylene glycol (MIRALAX) packet 17 g  17 g Oral DAILY PRN [place] Veliz MD        promethazine (PHENERGAN) tablet 12.5 mg  12.5 mg Oral Q6H PRN [place] Veliz MD        Or    ondansetron [facility]) injection 4 mg  4 mg IntraVENous Q6H PRN [place] Veliz MD        enoxaparin (LOVENOX) injection 30 mg  30 mg SubCUTAneous Q12H [place] Veliz MD   30 mg at 04/13/23 0921    amLODIPine (NORVASC) tablet 5 mg  5 mg Oral DAILY [place] Veliz MD   5 mg at 04/13/23 7740    aspirin chewable tablet 81 mg  81 mg Oral DAILY [place] Veliz MD   81 mg at 04/13/23 0921    lisinopriL (PRINIVIL, ZESTRIL) tablet 20 mg  20 mg Oral DAILY Arden Loyola MD   20 mg at 04/13/23 0921    pantoprazole (PROTONIX) tablet 40 mg  40 mg Oral ACB Arden Loyola MD   40 mg at 04/13/23 7612    rosuvastatin (CRESTOR) tablet 20 mg  20 mg Oral QHS Arden Loyola MD   20 mg at 04/12/23 2305    nitroglycerin (NITROSTAT) tablet 0.4 mg  0.4 mg SubLINGual PRN Arden Looyla MD        morphine injection 2 mg  2 mg IntraVENous Q3H PRN Arden Loyola MD   2 mg at 04/13/23 1204     Current Outpatient Medications   Medication Sig Dispense Refill    ticagrelor (BRILINTA) 90 mg tablet Take 1 Tablet by mouth every twelve (12) hours. 60 Tablet 1    rosuvastatin (CRESTOR) 20 mg tablet Take 1 Tablet by mouth nightly for 90 days. 30 Tablet 2    aspirin 81 mg chewable tablet Take 1 Tablet by mouth daily. May purchase over the counter 30 Tablet 11    lisinopriL (PRINIVIL, ZESTRIL) 20 mg tablet Take 1 Tablet by mouth daily. AM medication      amLODIPine (NORVASC) 5 mg tablet Take 1 Tablet by mouth daily. AM med      Omeprazole delayed release (PRILOSEC D/R) 20 mg tablet Take 1 Tablet by mouth daily. AM med      buprenorphine-naloxone 8-2 mg subl 0.5 Tablets by SubLINGual route three (3) times daily. PSHx  has no past surgical history on file. SocHx  reports that he has been smoking cigarettes. He uses smokeless tobacco. He reports current alcohol use. He reports that he does not currently use drugs. x family history includes Heart Disease in his brother and mother. PHYSICAL EXAM    Patient Vitals for the past 4 hrs:   BP   04/13/23 1226 111/70      Awake alert resting conversant. Face is symmetric, hearing and speech are normal.  EOMI, visual fields normal on both sides. Shoulder shrug is symmetric. No resting postural or intention tremors. 5 out of 5 strength in all extremities. Intact light touch in all extremities. Reflexes are symmetric.       Labs/ Radiology     See HPI    Assessment/ Plan       ICD-10-CM ICD-9-CM    1. NSTEMI (non-ST elevated myocardial infarction) ([facility] [address]  I21.4 410.70       2. Syncope and collapse  R55 780.2 EEG      EEG        D/w patient it's possible the nausea/ muscle complaints could be due to Crestor (listed as potential SEFx). He says he was not taking his simvastatin regularly prior to the heart cath, but did not have any of the similar symptoms when he took it. Recommend his Crestor be changed to simvastatin  (20 or 40 mg)    Brain MRI is pending    Added EEG    Will follow up tomorrow      Thank you for asking the Neurology Service to evaluate Ruiz Celis.       Signed By: Wendie Kovacs MD     April 13, 2023

## 2023-04-13 NOTE — ADVANCED PRACTICE NURSE
Patient offers change in chest pain - radiates to left shoulder and back. EKG unchanged and non-acute. Vitals stable. Morphine provided. Order for PRN Ativan placed. Nursing to continue to monitor. Anxious, NML WOB, lungs CTA bilat, RRR, NML S1/S2, no peripheral edema, skin warm and dry, movement noted in all extremities, NAD.

## 2023-04-14 ENCOUNTER — APPOINTMENT (OUTPATIENT)
Dept: NON INVASIVE DIAGNOSTICS | Age: 43
DRG: 313 | End: 2023-04-14
Attending: INTERNAL MEDICINE
Payer: COMMERCIAL

## 2023-04-14 VITALS
BODY MASS INDEX: 29.92 KG/M2 | OXYGEN SATURATION: 97 % | WEIGHT: 220.9 LBS | RESPIRATION RATE: 16 BRPM | HEIGHT: 72 IN | DIASTOLIC BLOOD PRESSURE: 72 MMHG | SYSTOLIC BLOOD PRESSURE: 123 MMHG | TEMPERATURE: 97.9 F | HEART RATE: 70 BPM

## 2023-04-14 PROBLEM — R77.8 ELEVATED TROPONIN: Status: ACTIVE | Noted: 2023-04-14

## 2023-04-14 PROBLEM — R79.89 ELEVATED TROPONIN: Status: ACTIVE | Noted: 2023-04-14

## 2023-04-14 PROBLEM — I21.4 NSTEMI (NON-ST ELEVATED MYOCARDIAL INFARCTION) (HCC): Status: RESOLVED | Noted: 2023-04-12 | Resolved: 2023-04-14

## 2023-04-14 LAB
ALBUMIN SERPL-MCNC: 3.8 G/DL (ref 3.5–5)
ALBUMIN/GLOB SERPL: 1.2 (ref 1.1–2.2)
ALP SERPL-CCNC: 69 U/L (ref 45–117)
ALT SERPL-CCNC: 36 U/L (ref 12–78)
ANION GAP SERPL CALC-SCNC: 3 MMOL/L (ref 5–15)
AST SERPL-CCNC: 20 U/L (ref 15–37)
ATRIAL RATE: 65 BPM
BASOPHILS # BLD: 0.1 K/UL (ref 0–0.1)
BASOPHILS NFR BLD: 1 % (ref 0–1)
BILIRUB SERPL-MCNC: 0.6 MG/DL (ref 0.2–1)
BUN SERPL-MCNC: 16 MG/DL (ref 6–20)
BUN/CREAT SERPL: 16 (ref 12–20)
CALCIUM SERPL-MCNC: 9.3 MG/DL (ref 8.5–10.1)
CALCULATED P AXIS, ECG09: 64 DEGREES
CALCULATED R AXIS, ECG10: 47 DEGREES
CALCULATED T AXIS, ECG11: 73 DEGREES
CHLORIDE SERPL-SCNC: 102 MMOL/L (ref 97–108)
CO2 SERPL-SCNC: 30 MMOL/L (ref 21–32)
CREAT SERPL-MCNC: 1.03 MG/DL (ref 0.7–1.3)
DIAGNOSIS, 93000: NORMAL
DIFFERENTIAL METHOD BLD: NORMAL
EOSINOPHIL # BLD: 0.4 K/UL (ref 0–0.4)
EOSINOPHIL NFR BLD: 5 % (ref 0–7)
ERYTHROCYTE [DISTWIDTH] IN BLOOD BY AUTOMATED COUNT: 11.8 % (ref 11.5–14.5)
GLOBULIN SER CALC-MCNC: 3.1 G/DL (ref 2–4)
GLUCOSE SERPL-MCNC: 110 MG/DL (ref 65–100)
HCT VFR BLD AUTO: 41.7 % (ref 36.6–50.3)
HGB BLD-MCNC: 14.3 G/DL (ref 12.1–17)
IMM GRANULOCYTES # BLD AUTO: 0 K/UL (ref 0–0.04)
IMM GRANULOCYTES NFR BLD AUTO: 0 % (ref 0–0.5)
LYMPHOCYTES # BLD: 2.3 K/UL (ref 0.8–3.5)
LYMPHOCYTES NFR BLD: 26 % (ref 12–49)
MAGNESIUM SERPL-MCNC: 1.9 MG/DL (ref 1.6–2.4)
MCH RBC QN AUTO: 29.2 PG (ref 26–34)
MCHC RBC AUTO-ENTMCNC: 34.3 G/DL (ref 30–36.5)
MCV RBC AUTO: 85.1 FL (ref 80–99)
MONOCYTES # BLD: 1 K/UL (ref 0–1)
MONOCYTES NFR BLD: 11 % (ref 5–13)
NEUTS SEG # BLD: 5.2 K/UL (ref 1.8–8)
NEUTS SEG NFR BLD: 57 % (ref 32–75)
NRBC # BLD: 0 K/UL (ref 0–0.01)
NRBC BLD-RTO: 0 PER 100 WBC
P-R INTERVAL, ECG05: 146 MS
PLATELET # BLD AUTO: 254 K/UL (ref 150–400)
PMV BLD AUTO: 10.7 FL (ref 8.9–12.9)
POTASSIUM SERPL-SCNC: 4.6 MMOL/L (ref 3.5–5.1)
PROT SERPL-MCNC: 6.9 G/DL (ref 6.4–8.2)
Q-T INTERVAL, ECG07: 386 MS
QRS DURATION, ECG06: 82 MS
QTC CALCULATION (BEZET), ECG08: 401 MS
RBC # BLD AUTO: 4.9 M/UL (ref 4.1–5.7)
SODIUM SERPL-SCNC: 135 MMOL/L (ref 136–145)
VENTRICULAR RATE, ECG03: 65 BPM
WBC # BLD AUTO: 9 K/UL (ref 4.1–11.1)

## 2023-04-14 PROCEDURE — 85025 COMPLETE CBC W/AUTO DIFF WBC: CPT

## 2023-04-14 PROCEDURE — 36415 COLL VENOUS BLD VENIPUNCTURE: CPT

## 2023-04-14 PROCEDURE — 74011250636 HC RX REV CODE- 250/636: Performed by: INTERNAL MEDICINE

## 2023-04-14 PROCEDURE — 93271 ECG/MONITORING AND ANALYSIS: CPT

## 2023-04-14 PROCEDURE — 95816 EEG AWAKE AND DROWSY: CPT | Performed by: PSYCHIATRY & NEUROLOGY

## 2023-04-14 PROCEDURE — 80053 COMPREHEN METABOLIC PANEL: CPT

## 2023-04-14 PROCEDURE — 74011000250 HC RX REV CODE- 250: Performed by: INTERNAL MEDICINE

## 2023-04-14 PROCEDURE — 74011250637 HC RX REV CODE- 250/637: Performed by: NURSE PRACTITIONER

## 2023-04-14 PROCEDURE — 74011250637 HC RX REV CODE- 250/637: Performed by: INTERNAL MEDICINE

## 2023-04-14 PROCEDURE — 83735 ASSAY OF MAGNESIUM: CPT

## 2023-04-14 RX ADMIN — LISINOPRIL 20 MG: 20 TABLET ORAL at 08:35

## 2023-04-14 RX ADMIN — Medication 10 ML: at 06:27

## 2023-04-14 RX ADMIN — MORPHINE SULFATE 2 MG: 2 INJECTION, SOLUTION INTRAMUSCULAR; INTRAVENOUS at 01:54

## 2023-04-14 RX ADMIN — AMLODIPINE BESYLATE 5 MG: 5 TABLET ORAL at 08:35

## 2023-04-14 RX ADMIN — MORPHINE SULFATE 2 MG: 2 INJECTION, SOLUTION INTRAMUSCULAR; INTRAVENOUS at 08:40

## 2023-04-14 RX ADMIN — PANTOPRAZOLE SODIUM 40 MG: 40 TABLET, DELAYED RELEASE ORAL at 06:28

## 2023-04-14 RX ADMIN — ASPIRIN 81 MG: 81 TABLET, CHEWABLE ORAL at 08:35

## 2023-04-14 RX ADMIN — TICAGRELOR 90 MG: 90 TABLET ORAL at 06:27

## 2023-04-14 RX ADMIN — ENOXAPARIN SODIUM 30 MG: 100 INJECTION SUBCUTANEOUS at 08:35

## 2023-04-14 NOTE — PROCEDURES
Name:   Amairani Styles  Age/ Sex:   36 y.o. male     Procedure:   EEG     Date of Recordin2023    Date of Interpretation:    23    Interpreting Physician: Mary Shukla MD     Indication:      ICD-10-CM ICD-9-CM    1. NSTEM[facility] (non[facility]ST [facility]) [facility]  I21.4 410.70       2. Syncope and collapse  R55 780.2 EEG      EEG      3. Nausea [R11.0 (ICD-10-CM)]  R11.0 787.02       4. Weakness generalized [R53.1 (ICD-10-CM)]  R53.1 780.79           Current medications: has a current medication list which includes the following prescription(s): ticagrelor, rosuvastatin, aspirin, lisinopril, amlodipine, omeprazole delayed release, and buprenorphine-naloxone, and the following Facility-Administered Medications: ticagrelor, sodium chloride, sodium chloride, acetaminophen **OR** acetaminophen, polyethylene glycol, promethazine **OR** ondansetron, enoxaparin, [Held by provider] amlodipine, aspirin, lisinopril, pantoprazole, [Held by provider] rosuvastatin, nitroglycerin, morphine. Technical: Digital EEG recorded in 10-20 international placement system, multiple montages    Interpretation:  Pt described as awake at the beginning of the recording. Background is symmetric. PDR is 8-9 Hz on both sides. Drowsiness is recorded and is normal.  Sleep is not recorded. Photic stimulation doesn't result in a driving response of either side. Hyperventilation is not performed. Single lead EKG is unremarkable. There are no focal areas of slowing, epileptiform discharge or subclinical seizure during this recording. Impression:  Normal awake and drowsy EEG. Clinical correlation is necessary.

## 2023-04-14 NOTE — PROGRESS NOTES
Report received pt laying in bed watching phone call bell within reach     [phone]  Pt given med without difficulty     0040  Rounds made pt laying in bed call bell within reach voiced no needs at this time    0154  Pt given 2 mg morphine for c/o pain     0628  Pt given meds without difficulty    0725  Bedside and Verbal shift change report given to Ruby (oncoming nurse) by Kim Mattson (offgoing nurse).  Report included the following information SBAR, Kardex, ED Summary, Intake/Output, MAR, Recent Results, and Cardiac Rhythm SR .

## 2023-04-14 NOTE — PROGRESS NOTES
NUTRITION    Best practice alert was triggered based on results obtained during nursing admission assessment for Unsure wt loss    Wt Readings from Last 10 Encounters:   04/13/23 100.2 kg (220 lb 14.4 oz)   04/11/23 100.2 kg (221 lb)   04/08/23 95.5 kg (210 lb 8.6 oz)   04/04/23 104.3 kg (230 lb)   04/01/23 104.3 kg (230 lb)   07/11/21 104.3 kg (230 lb)     Flowsheet Row Most Recent Value   Recently Lost Weight Without Trying No filed at 04/13/2023 1449   If Yes, How Much Weight Loss Unsure filed at 04/13/2023 1449   Eating Poorly Due to Decreased Appetite No filed at 04/13/2023 1449        The patient's chart was reviewed and nutrition assessment is not indicated at this time. Plan to see patient for rescreen per policy. Thank you.      Juanito Rivera RD MS  Ext: [phone], or via [employer]

## 2023-04-14 NOTE — PROGRESS NOTES
Bedside and Verbal shift change report given to Marcos RN (oncoming nurse) by Pepe Jorge RN (offgoing nurse). Report included the following information SBAR and Kardex.

## 2023-04-14 NOTE — PROGRESS NOTES
Neurology - Inpatient Progress Note     Name:   Chiki Miller  MRN:    [id number]  [address] [facility] Date:   4/12/2023    Follow up:   Fatigue, tired    Interval History     ***    ***      Brief ROS: As noted above         No Known Allergies    Current Facility-Administered Medications:     ticagrelor (BRILINTA) tablet 90 mg, 90 mg, Oral, Q12H, Jennifer Sheets NP, 90 mg at 04/14/23 8709    sodium chloride (NS) flush 5-40 mL, 5-40 mL, IntraVENous, Q8H, Kami Clemons MD, 10 mL at 04/14/23 7750    sodium chloride (NS) flush 5-40 mL, 5-40 mL, IntraVENous, PRN, Benjamín Hawkins MD    acetaminophen (TYLENOL) tablet 650 mg, 650 mg, Oral, Q6H PRN **OR** acetaminophen (TYLENOL) suppository 650 mg, 650 mg, Rectal, Q6H PRN, Kami Clemons MD    polyethylene glycol (MIRALAX) packet 17 g, 17 g, Oral, DAILY PRN, Kami Clemons MD    promethazine (PHENERGAN) tablet 12.5 mg, 12.5 mg, Oral, Q6H PRN **OR** ondansetron (ZOFRAN) injection 4 mg, 4 mg, IntraVENous, Q6H PRN, Kami Clemons MD    enoxaparin (LOVENOX) injection 30 mg, 30 mg, SubCUTAneous, Q12H, Kami Clemons MD, 30 mg at 04/14/23 0835    [Held by provider] amLODIPine (NORVASC) tablet 5 mg, 5 mg, Oral, DAILY, Kami Clemons MD, 5 mg at 04/14/23 8323    aspirin chewable tablet 81 mg, 81 mg, Oral, DAILY, Kami Clemons MD, 81 mg at 04/14/23 0835    lisinopriL (PRINIVIL, ZESTRIL) tablet 20 mg, 20 mg, Oral, DAILY, Kami Clemons MD, 20 mg at 04/14/23 0835    pantoprazole (PROTONIX) tablet 40 mg, 40 mg, Oral, ACB, Kami Clemons MD, 40 mg at 04/14/23 9641    [Held by provider] rosuvastatin (CRESTOR) tablet 20 mg, 20 mg, Oral, QHS, Kami Clemons MD, 20 mg at 04/13/23 5535    nitroglycerin (NITROSTAT) tablet 0.4 mg, 0.4 mg, SubLINGual, PRN, Benjamín Hawkins MD    morphine injection 2 mg, 2 mg, IntraVENous, Q3H PRN, Benjamín Hawkins MD, 2 mg at 04/14/23 0840      PMHx: has a past medical history of Heart attack ([facility] HTN (hypertension), benign, Hyperlipidemia, Obesity, and Smoking. PSHx: has no past surgical history on file. Impression / Plan       ICD-10-CM ICD-9-CM   1. NSTEMI (non-ST elevated myocardial infarction) ([facility]  I21.4 410.70   2. Syncope and collapse  R55 780.2   3. Nausea [R11.0 (ICD-10-CM)]  R11.0 787.02   4.  Weakness generalized [R53.1 (ICD-10-CM)]  R53.1 780.79     ***      Signed By: Francois Linares MD     April 14, 2023

## 2023-04-14 NOTE — PROGRESS NOTES
Reason for Admission:  NSTEMI                   RUR Score:   8%                  Plan for utilizing home health:    N/A  - no prior history    PCP: First and Last name:  Does not have a PCP   Name of Practice:    Are you a current patient: Yes/No:    Approximate date of last visit:    Can you participate in a virtual visit with your PCP:                     Current Advanced Directive/Advance Care Plan: Full Code    Healthcare Decision Maker:   Jacki Manzanares: Valentino Feeler, brother - [phone]                        Transition of Care Plan:    Patient reports that he is legally  and lives with his brother in a one story house in [facility]. Patient is independent and works as a devlin. Patient does not have a PCP. CM provided him with a list of providers; patient will arrange his own appointment. Patient lives outside the service area for [facility]. Patient has a blood pressure cuff. Plan is for outpatient cardiac rehab.        Patient to discharge home  Outpatient follow-up- cardiology, patient to establish with PCP   Brother to transport at discharge    Care Management Interventions  Transition of Care Consult (CM Consult): Discharge Planning  Support Systems: Other Family Member(s)  Confirm Follow Up Transport: Family  The Plan for Transition of Care is Related to the Following Treatment Goals : NSTEMI  Discharge Location  Patient Expects to be Discharged to[de-identified] Home     Tim Zapata LCSW

## 2023-04-14 NOTE — PROGRESS NOTES
Patient discharged, educated on discharge instructions and to stop taking amlodipine as per instructions. Patient also educated on external cardiac monitor.

## 2023-04-14 NOTE — DISCHARGE SUMMARY
Physician Discharge Summary     Patient ID:  Bryon Velez  [id number]  43 y.o.  1980    Admit date: 4/12/2023    Discharge date and time: 4/14/2023    Admission Diagnoses: Elevated troponin     Discharge Diagnoses:  Mr. Guzman Grijalva is a 37 yo male with PMH of HTN and recent LAD stent placement admitted for near syncope and chest pain. Pt seen and evaluated by cardiology and not thought to be 2/2 CAD. Limited Echo showed preserved EF and NO WMA. CTA without evidence of acute process. Pt did endorse near syncope at home with standing. Not orthostatic on day of discharge but SBP was only 121/71 prior to anti-hypertensives. Advised pt continue lisinopril but hold amlodipine and record BP's with resumption parameters given to patient. Will also d/c with event monitor to ensure no symptomatic bradycardia, arrhythmias. Advised that pt's statin may be accounting for his myalagias and if persists or worsens should stop. Similarly advised pt to check BP and HR if feeling abnormal and report to ED if CP, SOB, diaphoresis.      Do suspect pt had an anxiety component to symptoms as well and may benefit from outpt psych eval     PCP: Unknown, Provider, MD     Consults: Cardiology and Neurology    Discharge Exam:  Visit Vitals  /72 (BP 1 Location: Left upper arm, BP Patient Position: Standing)   Pulse 70   Temp 97.9 °F (36.6 °C)   Resp 16   Ht 6' (1.829 m)   Wt 100.2 kg (220 lb 14.4 oz)   SpO2 97%   BMI 29.96 kg/m²      General : alert x 3, awake, no acute distress,   HEENT: PEERL, EOMI, moist mucus membrane, TM clear  Neck: supple, no JVD, no meningeal signs  Chest: Clear to auscultation bilaterally   CVS: S1 S2 heard, Capillary refill less than 2 seconds  Abd: soft/ non tender, non distended, BS physiological,   Ext: no clubbing, no cyanosis, no edema, brisk 2+ DP pulses  Neuro/Psych: pleasant mood and affect, CN 2-12 grossly intact, sensory grossly within normal limit, Strength 5/5 in all extremities, DTR 1+ x 4  Skin: warm     Disposition: home    Patient Instructions:   Discharge Medication List as of 4/14/2023 10:30 AM        CONTINUE these medications which have NOT CHANGED    Details   ticagrelor (BRILINTA) 90 mg tablet Take 1 Tablet by mouth every twelve (12) hours. , Normal, Disp-60 Tablet, R-1      rosuvastatin (CRESTOR) 20 mg tablet Take 1 Tablet by mouth nightly for 90 days. , Normal, Disp-30 Tablet, R-2      aspirin 81 mg chewable tablet Take 1 Tablet by mouth daily. May purchase over the counter, No Print, Disp-30 Tablet, R-11      lisinopriL (PRINIVIL, ZESTRIL) 20 mg tablet Take 1 Tablet by mouth daily. AM medication, Historical Med      Omeprazole delayed release (PRILOSEC D/R) 20 mg tablet Take 1 Tablet by mouth daily. AM med, Historical Med      buprenorphine-naloxone 8-2 mg subl 0.5 Tablets by SubLINGual route three (3) times daily. , Historical Med           STOP taking these medications       amLODIPine (NORVASC) 5 mg tablet Comments:   Reason for Stopping:                Activity: Activity as tolerated  Diet: Resume previous diet  Wound Care: None needed    Please follow-up with cardiology     Approximate time spent in patient care on day of discharge: 35 minutes     Signed:  Jessica Thapa MD  4/14/2023  4:32 PM

## 2023-04-14 NOTE — DISCHARGE INSTRUCTIONS
HOSPITALIST DISCHARGE INSTRUCTIONS  NAME: Ace Yeboah   :  1980   MRN:  [id number]     Date/Time:  2023 9:20 AM    ADMIT DATE: 2023     DISCHARGE DATE: 2023     ADMITTING DIAGNOSIS:  Dizziness, lightheadness, malaise     DISCHARGE DIAGNOSIS:  As above     MEDICATIONS:  PLEASE HOLD YOUR AMLODIPINE; IF YOUR BLOOD PRESSURE IS ELEVATED CONSISTENTLY ABOVE 140 AS THE TOP NUMBER RESUME YOUR BLOOD PRESSURE. PLEASE CHECK YOUR BLOOD PRESSURE MULTIPLE TIMES DAILY AND RECORD. WHEN YOU FEEL \"UNWELL\" PLEASE RECORD BP AND heart rate at the time of symptoms. It is important that you take the medication exactly as they are prescribed. Keep your medication in the bottles provided by the pharmacist and keep a list of the medication names, dosages, and times to be taken in your wallet. Do not take other medications without consulting your doctor. Pain Management: per above medications    What to do at Home    Recommended diet:  Regular Diet    Recommended activity: Activity as tolerated    If you experience any of the following symptoms then please call your primary care physician or return to the emergency room if you cannot get hold of your doctor:  Fever, chills, nausea, vomiting, diarrhea, change in mentation, falling, bleeding, shortness of breath, chest pain    Information obtained by :  I understand that if any problems occur once I am at home I am to contact my physician. I understand and acknowledge receipt of the instructions indicated above.                                                                                                                                            Physician's or R.N.'s Signature                                                                  Date/Time                                                                                                                                              Patient or Representative Signature Date/Time

## 2023-04-14 NOTE — CARDIO/PULMONARY
[facility] Cardiopulmonary Rehab: 35 yo male admitted with dizziness, chest tightness & arm numbness (4/12). Initially, NSTEMI was suspected with peak troponin 227. Per Cardiology, this elevated troponin is not ACS. Hx: PTCA with GUEVARA to LAD (4/7/23). Patient is scheduled for intake appt at outpatient cardiac rehab program on 5/2/23.

## 2023-04-14 NOTE — PROGRESS NOTES
1945- Bedside and Verbal shift change report given to SUSAN Márquez RN (oncoming nurse) by  RN (offgoing nurse). Report included the following information SBAR, Kardex, Intake/Output, MAR, Recent Results, and Cardiac Rhythm   . 2030- Bedside and Verbal shift change report given to Maria R Terrell (oncoming nurse) by Yuridia Márquez RN (offgoing nurse). Report included the following information SBAR, Kardex, Intake/Output, MAR, Recent Results, and Cardiac Rhythm   .

## 2023-04-15 ENCOUNTER — HOSPITAL ENCOUNTER (EMERGENCY)
Age: 43
Discharge: HOME | End: 2023-04-16
Attending: EMERGENCY MEDICINE
Payer: COMMERCIAL

## 2023-04-15 VITALS
SYSTOLIC BLOOD PRESSURE: 120 MMHG | RESPIRATION RATE: 14 BRPM | TEMPERATURE: 98.1 F | HEART RATE: 68 BPM | OXYGEN SATURATION: 96 % | WEIGHT: 220 LBS | HEIGHT: 72 IN | BODY MASS INDEX: 29.8 KG/M2 | DIASTOLIC BLOOD PRESSURE: 74 MMHG

## 2023-04-15 DIAGNOSIS — E86.0 MILD DEHYDRATION: ICD-10-CM

## 2023-04-15 DIAGNOSIS — R07.9 INTERMITTENT CHEST PAIN: Primary | ICD-10-CM

## 2023-04-15 LAB
ALBUMIN SERPL-MCNC: 4.2 G/DL (ref 3.5–5)
ALBUMIN/GLOB SERPL: 1.4 (ref 1.1–2.2)
ALP SERPL-CCNC: 70 U/L (ref 45–117)
ALT SERPL-CCNC: 41 U/L (ref 12–78)
ANION GAP SERPL CALC-SCNC: 5 MMOL/L (ref 5–15)
AST SERPL-CCNC: 23 U/L (ref 15–37)
ATRIAL RATE: 64 BPM
BASOPHILS # BLD: 0.1 K/UL (ref 0–0.1)
BASOPHILS NFR BLD: 1 % (ref 0–1)
BILIRUB SERPL-MCNC: 0.4 MG/DL (ref 0.2–1)
BUN SERPL-MCNC: 21 MG/DL (ref 6–20)
BUN/CREAT SERPL: 16 (ref 12–20)
CALCIUM SERPL-MCNC: 8.9 MG/DL (ref 8.5–10.1)
CALCULATED P AXIS, ECG09: 46 DEGREES
CALCULATED R AXIS, ECG10: 12 DEGREES
CALCULATED T AXIS, ECG11: 41 DEGREES
CHLORIDE SERPL-SCNC: 103 MMOL/L (ref 97–108)
CO2 SERPL-SCNC: 28 MMOL/L (ref 21–32)
COMMENT, HOLDF: NORMAL
CREAT SERPL-MCNC: 1.32 MG/DL (ref 0.7–1.3)
DIAGNOSIS, 93000: NORMAL
DIFFERENTIAL METHOD BLD: ABNORMAL
EOSINOPHIL # BLD: 0.4 K/UL (ref 0–0.4)
EOSINOPHIL NFR BLD: 3 % (ref 0–7)
ERYTHROCYTE [DISTWIDTH] IN BLOOD BY AUTOMATED COUNT: 11.6 % (ref 11.5–14.5)
GLOBULIN SER CALC-MCNC: 3.1 G/DL (ref 2–4)
GLUCOSE SERPL-MCNC: 127 MG/DL (ref 65–100)
HCT VFR BLD AUTO: 38.4 % (ref 36.6–50.3)
HGB BLD-MCNC: 13.8 G/DL (ref 12.1–17)
IMM GRANULOCYTES # BLD AUTO: 0.1 K/UL (ref 0–0.04)
IMM GRANULOCYTES NFR BLD AUTO: 1 % (ref 0–0.5)
LYMPHOCYTES # BLD: 2.2 K/UL (ref 0.8–3.5)
LYMPHOCYTES NFR BLD: 15 % (ref 12–49)
MCH RBC QN AUTO: 29.6 PG (ref 26–34)
MCHC RBC AUTO-ENTMCNC: 35.9 G/DL (ref 30–36.5)
MCV RBC AUTO: 82.4 FL (ref 80–99)
MONOCYTES # BLD: 1.1 K/UL (ref 0–1)
MONOCYTES NFR BLD: 8 % (ref 5–13)
NEUTS SEG # BLD: 10.4 K/UL (ref 1.8–8)
NEUTS SEG NFR BLD: 72 % (ref 32–75)
NRBC # BLD: 0 K/UL (ref 0–0.01)
NRBC BLD-RTO: 0 PER 100 WBC
P-R INTERVAL, ECG05: 148 MS
PLATELET # BLD AUTO: 267 K/UL (ref 150–400)
PMV BLD AUTO: 11 FL (ref 8.9–12.9)
POTASSIUM SERPL-SCNC: 3.4 MMOL/L (ref 3.5–5.1)
PROT SERPL-MCNC: 7.3 G/DL (ref 6.4–8.2)
Q-T INTERVAL, ECG07: 408 MS
QRS DURATION, ECG06: 90 MS
QTC CALCULATION (BEZET), ECG08: 420 MS
RBC # BLD AUTO: 4.66 M/UL (ref 4.1–5.7)
SAMPLES BEING HELD,HOLD: NORMAL
SODIUM SERPL-SCNC: 136 MMOL/L (ref 136–145)
TROPONIN I SERPL HS-MCNC: 48 NG/L (ref 0–76)
TROPONIN I SERPL HS-MCNC: 48 NG/L (ref 0–76)
VENTRICULAR RATE, ECG03: 64 BPM
WBC # BLD AUTO: 14.3 K/UL (ref 4.1–11.1)

## 2023-04-15 PROCEDURE — 93005 ELECTROCARDIOGRAM TRACING: CPT

## 2023-04-15 PROCEDURE — 80053 COMPREHEN METABOLIC PANEL: CPT

## 2023-04-15 PROCEDURE — 36415 COLL VENOUS BLD VENIPUNCTURE: CPT

## 2023-04-15 PROCEDURE — 74011250636 HC RX REV CODE- 250/636: Performed by: EMERGENCY MEDICINE

## 2023-04-15 PROCEDURE — 99284 EMERGENCY DEPT VISIT MOD MDM: CPT

## 2023-04-15 PROCEDURE — 85025 COMPLETE CBC W/AUTO DIFF WBC: CPT

## 2023-04-15 PROCEDURE — 74011250637 HC RX REV CODE- 250/637: Performed by: EMERGENCY MEDICINE

## 2023-04-15 PROCEDURE — 96361 HYDRATE IV INFUSION ADD-ON: CPT

## 2023-04-15 PROCEDURE — 84484 ASSAY OF TROPONIN QUANT: CPT

## 2023-04-15 PROCEDURE — 96360 HYDRATION IV INFUSION INIT: CPT

## 2023-04-15 RX ORDER — POTASSIUM CHLORIDE 750 MG/1
40 TABLET, FILM COATED, EXTENDED RELEASE ORAL
Status: COMPLETED | OUTPATIENT
Start: 2023-04-15 | End: 2023-04-15

## 2023-04-15 RX ADMIN — SODIUM CHLORIDE 1000 ML: 9 INJECTION, SOLUTION INTRAVENOUS at 21:23

## 2023-04-15 RX ADMIN — POTASSIUM CHLORIDE 40 MEQ: 750 TABLET, FILM COATED, EXTENDED RELEASE ORAL at 21:23

## 2023-04-15 NOTE — ED NOTES
Received call from pt asking what medications he is supposed to be taking tonight. He states he lost his discharge paperwork and is unaware. After reviewing his AVS, the only thing that was noted was for him to stop taking his amlodipine.   P

## 2023-04-16 LAB
ATRIAL RATE: 70 BPM
CALCULATED P AXIS, ECG09: 41 DEGREES
CALCULATED R AXIS, ECG10: 20 DEGREES
CALCULATED T AXIS, ECG11: 40 DEGREES
DIAGNOSIS, 93000: NORMAL
P-R INTERVAL, ECG05: 156 MS
Q-T INTERVAL, ECG07: 414 MS
QRS DURATION, ECG06: 96 MS
QTC CALCULATION (BEZET), ECG08: 447 MS
VENTRICULAR RATE, ECG03: 70 BPM

## 2023-04-17 NOTE — ED PROVIDER NOTES
The history is provided by the patient. Chest Pain   This is a recurrent problem. The current episode started 12 to 24 hours ago. The problem has not changed since onset. Episode frequency: waxing and waning constantly. The pain is associated with normal activity. The pain is present in the substernal region. The quality of the pain is described as pressure-like. The pain does not radiate. Associated symptoms include nausea. Pertinent negatives include no fever, no irregular heartbeat and no vomiting. Associated symptoms comments: Feeling like something is wrong. He has tried nothing for the symptoms. Risk factors include cardiac disease. Past Medical History:   Diagnosis Date    Heart attack [facility])     At age 31 per patient    HTN (hypertension), benign     Hyperlipidemia     Obesity     Smoking        No past surgical history on file. Family History:   Problem Relation Age of Onset    Heart Disease Mother     Heart Disease Brother        Social History     Socioeconomic History    Marital status:      Spouse name: Not on file    Number of children: Not on file    Years of education: Not on file    Highest education level: Not on file   Occupational History    Not on file   Tobacco Use    Smoking status: Every Day     Types: Cigarettes    Smokeless tobacco: Current   Vaping Use    Vaping Use: Not on file   Substance and Sexual Activity    Alcohol use: Yes    Drug use: Not Currently    Sexual activity: Not on file   Other Topics Concern    Not on file   Social History Narrative    Not on file     Social Determinants of Health     Financial Resource Strain: Not on file   Food Insecurity: Not on file   Transportation Needs: Not on file   Physical Activity: Not on file   Stress: Not on file   Social Connections: Not on file   Intimate Partner Violence: Not on file   Housing Stability: Not on file         ALLERGIES: Patient has no known allergies.     Review of Systems   Constitutional:  Negative for fever.   Cardiovascular:  Positive for chest pain. Gastrointestinal:  Positive for nausea. Negative for vomiting. Vitals:    04/15/23 2011 04/15/23 2035 04/15/23 2039 04/15/23 2044   BP: (!) 147/85 120/74     Pulse: 74  67 68   Resp: 18  14    Temp: 98.1 °F (36.7 °C)      SpO2: 96%      Weight: 99.8 kg (220 lb)      Height: 6' (1.829 m)               Physical Exam  Vitals and nursing note reviewed. Constitutional:       General: He is not in acute distress. Appearance: He is well-developed. HENT:      Head: Normocephalic and atraumatic. Eyes:      Conjunctiva/sclera: Conjunctivae normal.   Neck:      Trachea: No tracheal deviation. Cardiovascular:      Rate and Rhythm: Normal rate and regular rhythm. Heart sounds: Normal heart sounds. Pulmonary:      Effort: Pulmonary effort is normal. No respiratory distress. Breath sounds: Normal breath sounds. Abdominal:      General: There is no distension. Musculoskeletal:         General: No deformity. Normal range of motion. Cervical back: Neck supple. Skin:     General: Skin is warm and dry. Neurological:      Mental Status: He is alert. Cranial Nerves: No cranial nerve deficit. Psychiatric:         Behavior: Behavior normal.        Medical Decision Making  36 y.o. male presents with ongoing chest pain. Had recent admission for similar. He feels like there is something wrong and we have not been able to find it. He does have recent stenting. No signs of stent failure or ACS otherwise with nonischemic EKG and serial troponins that are not elevated and are flat. WBC is elevated and kidney function slightly depressed suggesting dehydration and potassium was repleted. He is to follow up with cardiology for monitoring and continue dual antiplatelet therapy. Considered imaging but had CXR and CTA recently which showed no abnormalities so will not repeat. Return precautions were discussed for worsening or new concerning symptoms. Problems Addressed:  Intermittent chest pain: acute illness or injury  Mild dehydration: acute illness or injury    Amount and/or Complexity of Data Reviewed  Labs: ordered. Decision-making details documented in ED Course. ECG/medicine tests: ordered and independent interpretation performed. Decision-making details documented in ED Course. Risk  Prescription drug management. ED Course as of 04/17/23 0426   Sat Apr 15, 2023   2020 EKG, 12 LEAD, INITIAL  ED EKG interpretation:  Rhythm: normal sinus rhythm; and regular . Rate (approx.): 70; Axis: normal; P wave: normal; QRS interval: normal ; ST/T wave: non-specific changes; Other findings: abnormal ekg.  This EKG was interpreted by Scot Fonseca MD,ED Provider.  Titi Prescott      ED Course User Index  [CH] Ericka Landa MD       Procedures

## 2023-04-20 ENCOUNTER — TELEPHONE (OUTPATIENT)
Dept: CARDIOLOGY CLINIC | Age: 43
End: 2023-04-20

## 2023-04-20 DIAGNOSIS — E78.5 DYSLIPIDEMIA: ICD-10-CM

## 2023-04-20 DIAGNOSIS — I25.10 CORONARY ARTERY DISEASE INVOLVING NATIVE CORONARY ARTERY OF NATIVE HEART WITHOUT ANGINA PECTORIS: Primary | ICD-10-CM

## 2023-04-20 NOTE — TELEPHONE ENCOUNTER
Attempted to call patient on number in chart. Disconnected. Called brother on file.   Brother will attempt to call patient and have him contact office to review provider recommendations:

## 2023-04-20 NOTE — TELEPHONE ENCOUNTER
Pt called and stated he's having side effect's from medication, chest pain's and sob. Gloria Suleiman  No chest pain's or sob at this time, would like to know can doctor prescribe something different,please advise    Pt # [phone]

## 2023-04-20 NOTE — TELEPHONE ENCOUNTER
Pt is calling about the medication he is on and would like to change the medicine due to the side effects.       [phone] cell

## 2023-04-21 RX ORDER — CLOPIDOGREL BISULFATE 75 MG/1
TABLET ORAL
Qty: 94 TABLET | Refills: 3 | Status: SHIPPED | OUTPATIENT
Start: 2023-04-21

## 2023-04-21 NOTE — TELEPHONE ENCOUNTER
Spoke with patient. Reviewed new medication instructions per provider instruction. Understanding expressed. Refill per VO of Dr. Steven Duran  Last appt: Visit date not found  Future Appointments   Date Time Provider Umm Carroll   5/2/2023  1:30 PM [facility] [id number]    5/16/2023 10:20 AM Karlie Laurent MD [facility]       Requested Prescriptions     Signed Prescriptions Disp Refills    clopidogreL (Plavix) 75 mg tab 94 Tablet 3     Sig: Take 4 tablets by mouth first day of starting medication. Day two and after take (1) tablet by mouth daily.      Authorizing Provider: Anjelica Willson     Ordering User: Boneta Peabody

## 2023-05-14 PROBLEM — R79.89 ELEVATED TROPONIN: Status: RESOLVED | Noted: 2023-04-14 | Resolved: 2023-05-14

## 2023-05-14 PROBLEM — R77.8 ELEVATED TROPONIN: Status: RESOLVED | Noted: 2023-04-14 | Resolved: 2023-05-14

## 2023-05-15 ENCOUNTER — TELEPHONE (OUTPATIENT)
Age: 43
End: 2023-05-15

## 2023-05-15 RX ORDER — TICAGRELOR 90 MG/1
90 TABLET ORAL EVERY 12 HOURS
COMMUNITY
Start: 2023-04-08

## 2023-05-15 RX ORDER — METHOCARBAMOL 750 MG/1
TABLET, FILM COATED ORAL
COMMUNITY
Start: 2023-05-05

## 2023-05-15 RX ORDER — BUPRENORPHINE HYDROCHLORIDE AND NALOXONE HYDROCHLORIDE DIHYDRATE 8; 2 MG/1; MG/1
0.5 TABLET SUBLINGUAL 3 TIMES DAILY
COMMUNITY
Start: 2023-03-08

## 2023-05-15 RX ORDER — DICYCLOMINE HCL 20 MG
TABLET ORAL
COMMUNITY
Start: 2023-04-01

## 2023-05-15 RX ORDER — ASPIRIN 81 MG/1
81 TABLET, CHEWABLE ORAL DAILY
Qty: 30 TABLET | Refills: 11 | COMMUNITY
Start: 2023-04-08 | End: 2024-04-07

## 2023-05-15 RX ORDER — HYDROCODONE BITARTRATE AND ACETAMINOPHEN 5; 325 MG/1; MG/1
TABLET ORAL
COMMUNITY
Start: 2023-04-22

## 2023-05-15 RX ORDER — CLOPIDOGREL BISULFATE 75 MG/1
TABLET ORAL
COMMUNITY
Start: 2023-04-21

## 2023-05-15 RX ORDER — LISINOPRIL 20 MG/1
20 TABLET ORAL DAILY
COMMUNITY

## 2023-05-15 RX ORDER — ROSUVASTATIN CALCIUM 20 MG/1
20 TABLET, COATED ORAL
COMMUNITY
Start: 2023-04-07 | End: 2023-05-16 | Stop reason: SINTOL

## 2023-05-16 DIAGNOSIS — I25.10 ATHEROSCLEROSIS OF NATIVE CORONARY ARTERY OF NATIVE HEART WITHOUT ANGINA PECTORIS: ICD-10-CM

## 2023-05-16 DIAGNOSIS — E78.2 MIXED HYPERLIPIDEMIA: Primary | ICD-10-CM

## 2023-05-16 RX ORDER — SIMVASTATIN 40 MG
40 TABLET ORAL NIGHTLY
Qty: 90 TABLET | Refills: 1 | Status: SHIPPED | OUTPATIENT
Start: 2023-05-16

## 2023-05-16 NOTE — TELEPHONE ENCOUNTER
Requested Prescriptions     Signed Prescriptions Disp Refills    simvastatin (ZOCOR) 40 MG tablet 90 tablet 1     Sig: Take 1 tablet by mouth nightly     Authorizing Provider: Sujit Falcon     Ordering User: Kristian Bashir

## 2024-02-16 DIAGNOSIS — I25.10 ATHEROSCLEROSIS OF NATIVE CORONARY ARTERY OF NATIVE HEART WITHOUT ANGINA PECTORIS: ICD-10-CM

## 2024-02-16 DIAGNOSIS — E78.2 MIXED HYPERLIPIDEMIA: ICD-10-CM

## 2024-02-16 RX ORDER — SIMVASTATIN 40 MG
40 TABLET ORAL NIGHTLY
Qty: 30 TABLET | Refills: 1 | Status: SHIPPED | OUTPATIENT
Start: 2024-02-16

## 2024-08-22 RX ORDER — CLOPIDOGREL BISULFATE 75 MG/1
TABLET ORAL
Qty: 94 TABLET | Refills: 0 | OUTPATIENT
Start: 2024-08-22

## (undated) LAB
ALBUMIN SERPL-MCNC: 3.2 G/DL (ref 3.5–5)
ALBUMIN/GLOB SERPL: 1.1 (ref 1.1–2.2)
ALP SERPL-CCNC: 62 U/L (ref 45–117)
ALT SERPL-CCNC: 19 U/L (ref 12–78)
ANION GAP SERPL CALC-SCNC: 3 MMOL/L (ref 5–15)
AST SERPL-CCNC: 16 U/L (ref 15–37)
ATRIAL RATE: 61 BPM
BASOPHILS # BLD: 0.1 K/UL (ref 0–0.1)
BASOPHILS NFR BLD: 1 % (ref 0–1)
BILIRUB SERPL-MCNC: 0.6 MG/DL (ref 0.2–1)
BUN SERPL-MCNC: 13 MG/DL (ref 6–20)
BUN/CREAT SERPL: 15 (ref 12–20)
CALCIUM SERPL-MCNC: 8.3 MG/DL (ref 8.5–10.1)
CALCULATED P AXIS, ECG09: 36 DEGREES
CALCULATED R AXIS, ECG10: 19 DEGREES
CALCULATED T AXIS, ECG11: 24 DEGREES
CHLORIDE SERPL-SCNC: 112 MMOL/L (ref 97–108)
CHOLEST SERPL-MCNC: 192 MG/DL
CO2 SERPL-SCNC: 25 MMOL/L (ref 21–32)
CREAT SERPL-MCNC: 0.87 MG/DL (ref 0.7–1.3)
DIAGNOSIS, 93000: NORMAL
DIFFERENTIAL METHOD BLD: NORMAL
ECHO AO ASC DIAM: 3.7 CM
ECHO AO ASCENDING AORTA INDEX: 1.64 CM/M2
ECHO AV AREA PEAK VELOCITY: 2.1 CM2
ECHO AV AREA VTI: 2 CM2
ECHO AV AREA/BSA PEAK VELOCITY: 0.9 CM2/M2
ECHO AV AREA/BSA VTI: 0.9 CM2/M2
ECHO AV MEAN GRADIENT: 10 MMHG
ECHO AV MEAN VELOCITY: 1.5 M/S
ECHO AV PEAK GRADIENT: 16 MMHG
ECHO AV PEAK VELOCITY: 2 M/S
ECHO AV VELOCITY RATIO: 0.7
ECHO AV VTI: 44.5 CM
ECHO LA DIAMETER INDEX: 1.77 CM/M2
ECHO LA DIAMETER: 4 CM
ECHO LA VOL 2C: 53 ML (ref 18–58)
ECHO LA VOL 4C: 48 ML (ref 18–58)
ECHO LA VOL BP: 51 ML (ref 18–58)
ECHO LA VOL/BSA BIPLANE: 23 ML/M2 (ref 16–34)
ECHO LA VOLUME AREA LENGTH: 55 ML
ECHO LA VOLUME INDEX A2C: 23 ML/M2 (ref 16–34)
ECHO LA VOLUME INDEX A4C: 21 ML/M2 (ref 16–34)
ECHO LA VOLUME INDEX AREA LENGTH: 24 ML/M2 (ref 16–34)
ECHO LV E' LATERAL VELOCITY: 14 CM/S
ECHO LV E' SEPTAL VELOCITY: 11 CM/S
ECHO LV EDV A2C: 79 ML
ECHO LV EDV A4C: 93 ML
ECHO LV EDV BP: 88 ML (ref 67–155)
ECHO LV EDV INDEX A4C: 41 ML/M2
ECHO LV EDV INDEX BP: 39 ML/M2
ECHO LV EDV NDEX A2C: 35 ML/M2
ECHO LV EJECTION FRACTION A2C: 58 %
ECHO LV EJECTION FRACTION A4C: 67 %
ECHO LV EJECTION FRACTION BIPLANE: 61 % (ref 55–100)
ECHO LV ESV A2C: 33 ML
ECHO LV ESV A4C: 31 ML
ECHO LV ESV BP: 34 ML (ref 22–58)
ECHO LV ESV INDEX A2C: 15 ML/M2
ECHO LV ESV INDEX A4C: 14 ML/M2
ECHO LV ESV INDEX BP: 15 ML/M2
ECHO LV FRACTIONAL SHORTENING: 35 % (ref 28–44)
ECHO LV INTERNAL DIMENSION DIASTOLE INDEX: 2.39 CM/M2
ECHO LV INTERNAL DIMENSION DIASTOLIC: 5.4 CM (ref 4.2–5.9)
ECHO LV INTERNAL DIMENSION SYSTOLIC INDEX: 1.55 CM/M2
ECHO LV INTERNAL DIMENSION SYSTOLIC: 3.5 CM
ECHO LV IVSD: 1.2 CM (ref 0.6–1)
ECHO LV MASS 2D: 264.4 G (ref 88–224)
ECHO LV MASS INDEX 2D: 117 G/M2 (ref 49–115)
ECHO LV POSTERIOR WALL DIASTOLIC: 1.2 CM (ref 0.6–1)
ECHO LV RELATIVE WALL THICKNESS RATIO: 0.44
ECHO LVOT AREA: 3.1 CM2
ECHO LVOT AV VTI INDEX: 0.65
ECHO LVOT DIAM: 2 CM
ECHO LVOT MEAN GRADIENT: 4 MMHG
ECHO LVOT PEAK GRADIENT: 8 MMHG
ECHO LVOT PEAK VELOCITY: 1.4 M/S
ECHO LVOT STROKE VOLUME INDEX: 40 ML/M2
ECHO LVOT SV: 90.4 ML
ECHO LVOT VTI: 28.8 CM
ECHO MV A VELOCITY: 0.84 M/S
ECHO MV E DECELERATION TIME (DT): 289.9 MS
ECHO MV E VELOCITY: 0.82 M/S
ECHO MV E/A RATIO: 0.98
ECHO MV E/E' LATERAL: 5.86
ECHO MV E/E' RATIO (AVERAGED): 6.66
ECHO MV E/E' SEPTAL: 7.45
ECHO MV REGURGITANT PEAK GRADIENT: 92 MMHG
ECHO MV REGURGITANT PEAK VELOCITY: 4.8 M/S
ECHO PULMONARY ARTERY END DIASTOLIC PRESSURE: 5 MMHG
ECHO PV MAX VELOCITY: 1.3 M/S
ECHO PV PEAK GRADIENT: 7 MMHG
ECHO PV REGURGITANT MAX VELOCITY: 1.1 M/S
ECHO RV INTERNAL DIMENSION: 3.6 CM
ECHO RV TAPSE: 2.2 CM (ref 1.7–?)
ECHO RVOT PEAK GRADIENT: 3 MMHG
ECHO RVOT PEAK VELOCITY: 0.9 M/S
ECHO TV REGURGITANT MAX VELOCITY: 2.47 M/S
ECHO TV REGURGITANT PEAK GRADIENT: 25 MMHG
EOSINOPHIL # BLD: 0.3 K/UL (ref 0–0.4)
EOSINOPHIL NFR BLD: 4 % (ref 0–7)
ERYTHROCYTE [DISTWIDTH] IN BLOOD BY AUTOMATED COUNT: 12 % (ref 11.5–14.5)
GLOBULIN SER CALC-MCNC: 3 G/DL (ref 2–4)
GLUCOSE SERPL-MCNC: 97 MG/DL (ref 65–100)
HCT VFR BLD AUTO: 38.2 % (ref 36.6–50.3)
HDLC SERPL-MCNC: 52 MG/DL
HDLC SERPL: 3.7 (ref 0–5)
HGB BLD-MCNC: 13.3 G/DL (ref 12.1–17)
IMM GRANULOCYTES # BLD AUTO: 0 K/UL (ref 0–0.04)
IMM GRANULOCYTES NFR BLD AUTO: 0 % (ref 0–0.5)
LDLC SERPL CALC-MCNC: 98 MG/DL (ref 0–100)
LYMPHOCYTES # BLD: 2.4 K/UL (ref 0.8–3.5)
LYMPHOCYTES NFR BLD: 32 % (ref 12–49)
MCH RBC QN AUTO: 29.6 PG (ref 26–34)
MCHC RBC AUTO-ENTMCNC: 34.8 G/DL (ref 30–36.5)
MCV RBC AUTO: 84.9 FL (ref 80–99)
MONOCYTES # BLD: 0.6 K/UL (ref 0–1)
MONOCYTES NFR BLD: 8 % (ref 5–13)
NEUTS SEG # BLD: 4 K/UL (ref 1.8–8)
NEUTS SEG NFR BLD: 55 % (ref 32–75)
NRBC # BLD: 0 K/UL (ref 0–0.01)
NRBC BLD-RTO: 0 PER 100 WBC
P-R INTERVAL, ECG05: 144 MS
PLATELET # BLD AUTO: 234 K/UL (ref 150–400)
PMV BLD AUTO: 11.1 FL (ref 8.9–12.9)
POTASSIUM SERPL-SCNC: 4 MMOL/L (ref 3.5–5.1)
PROT SERPL-MCNC: 6.2 G/DL (ref 6.4–8.2)
Q-T INTERVAL, ECG07: 402 MS
QRS DURATION, ECG06: 86 MS
QTC CALCULATION (BEZET), ECG08: 404 MS
RBC # BLD AUTO: 4.5 M/UL (ref 4.1–5.7)
SODIUM SERPL-SCNC: 140 MMOL/L (ref 136–145)
TRIGL SERPL-MCNC: 210 MG/DL (ref ?–150)
VENTRICULAR RATE, ECG03: 61 BPM
VLDLC SERPL CALC-MCNC: 42 MG/DL
WBC # BLD AUTO: 7.4 K/UL (ref 4.1–11.1)